# Patient Record
Sex: MALE | Race: ASIAN | Employment: UNEMPLOYED | URBAN - METROPOLITAN AREA
[De-identification: names, ages, dates, MRNs, and addresses within clinical notes are randomized per-mention and may not be internally consistent; named-entity substitution may affect disease eponyms.]

---

## 2018-10-06 ENCOUNTER — HOSPITAL ENCOUNTER (EMERGENCY)
Age: 58
Discharge: HOME | End: 2018-10-06
Attending: EMERGENCY MEDICINE
Payer: SELF-PAY

## 2018-10-06 ENCOUNTER — APPOINTMENT (OUTPATIENT)
Dept: CT IMAGING | Age: 58
End: 2018-10-06
Attending: NURSE PRACTITIONER
Payer: SELF-PAY

## 2018-10-06 VITALS
TEMPERATURE: 98 F | WEIGHT: 192 LBS | SYSTOLIC BLOOD PRESSURE: 188 MMHG | BODY MASS INDEX: 32.78 KG/M2 | HEIGHT: 64 IN | HEART RATE: 79 BPM | OXYGEN SATURATION: 98 % | DIASTOLIC BLOOD PRESSURE: 94 MMHG | RESPIRATION RATE: 16 BRPM

## 2018-10-06 DIAGNOSIS — M53.9 MULTILEVEL DEGENERATIVE DISC DISEASE: ICD-10-CM

## 2018-10-06 DIAGNOSIS — M54.50 LUMBAR BACK PAIN: Primary | ICD-10-CM

## 2018-10-06 LAB
ANION GAP BLD CALC-SCNC: 17 MMOL/L (ref 10–20)
BUN BLD-MCNC: 11 MG/DL (ref 9–20)
CA-I BLD-MCNC: 1.05 MMOL/L (ref 1.12–1.32)
CHLORIDE BLD-SCNC: 100 MMOL/L (ref 98–107)
CO2 BLD-SCNC: 27 MMOL/L (ref 21–32)
CREAT BLD-MCNC: 1 MG/DL (ref 0.6–1.3)
GLUCOSE BLD-MCNC: 85 MG/DL (ref 65–100)
HCT VFR BLD CALC: 55 % (ref 36.6–50.3)
POTASSIUM BLD-SCNC: 3.9 MMOL/L (ref 3.5–5.1)
SERVICE CMNT-IMP: ABNORMAL
SODIUM BLD-SCNC: 140 MMOL/L (ref 136–145)

## 2018-10-06 PROCEDURE — 99283 EMERGENCY DEPT VISIT LOW MDM: CPT

## 2018-10-06 PROCEDURE — 80047 BASIC METABLC PNL IONIZED CA: CPT

## 2018-10-06 PROCEDURE — 72131 CT LUMBAR SPINE W/O DYE: CPT

## 2018-10-06 RX ORDER — AMLODIPINE BESYLATE 5 MG/1
5 TABLET ORAL DAILY
COMMUNITY
End: 2019-01-04 | Stop reason: SDUPTHER

## 2018-10-06 RX ORDER — ATENOLOL 50 MG/1
50 TABLET ORAL DAILY
COMMUNITY
End: 2018-11-02 | Stop reason: ALTCHOICE

## 2018-10-06 NOTE — ED PROVIDER NOTES
HPI Comments: Eliseo Boyce is a 62 y.o. male who presents ambulatory to the ED with  c/o low back pain. Patient states he has a \"few year\" history of low back pain. Patient states he has been to multiple doctors that \"just do not give me any answers. \" Patient denies recent trauma, stating the pain has increased over the last few months. Patient also states he has had a couple on incidents of being incontinent of bowel and bladder. Has seen a chiropractor with no relief. 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 Also states he is non compliant with ordered BP medications. Denies chest pain, SOB, dizziness, recent trauma, denies fever or chills, nausea or vomiting. Denies any current neurologic deficit. Has never had any imaging. PCP: No primary care provider on file. PMHx significant for: No past medical history on file. PSHx significant for: No past surgical history on file. There are no further complaints or symptoms at this time. The history is provided by the patient. 2:07 PM 
I have evaluated the patient as the Provider in Triage. I have reviewed His vital signs and the triage nurse assessment. I have talked with the patient and any available family and advised that I am the provider in triage and have ordered the appropriate study to initiate their work up based on the clinical presentation during my assessment. I have advised that the patient will be accommodated in the Main ED as soon as possible. I have also requested to contact the triage nurse or myself immediately if the patient experiences any changes in their condition during this brief waiting period. 63 yo M presents with right lower back pain onset 30 min ago. /111 in triage, states he did not take his BP meds today. Miriam Cherry MD 
 
 
No past medical history on file. No past surgical history on file. No family history on file. Social History Social History  Marital status: N/A  
 Spouse name: N/A  
 Number of children: N/A  
 Years of education: N/A Occupational History  Not on file. Social History Main Topics  Smoking status: Not on file  Smokeless tobacco: Not on file  Alcohol use Not on file  Drug use: Not on file  Sexual activity: Not on file Other Topics Concern  Not on file Social History Narrative ALLERGIES: Review of patient's allergies indicates no known allergies. Review of Systems Constitutional: Positive for activity change (\"moves at a slower pace\"). Negative for appetite change, chills, fatigue and fever. HENT: Negative for congestion, ear discharge, ear pain, sinus pain, sinus pressure, sore throat and trouble swallowing. Eyes: Negative for photophobia, pain, redness, itching and visual disturbance. Respiratory: Negative for chest tightness and shortness of breath. Cardiovascular: Negative for chest pain and palpitations. Gastrointestinal: Negative for abdominal distention, abdominal pain, nausea and vomiting. Endocrine: Negative. Genitourinary: Negative for difficulty urinating, frequency and urgency. Musculoskeletal: Positive for back pain (lumbar). Negative for neck pain and neck stiffness. Skin: Negative for color change, pallor, rash and wound. Allergic/Immunologic: Negative. Neurological: Negative for dizziness, syncope, weakness and headaches. Hematological: Does not bruise/bleed easily. Psychiatric/Behavioral: Negative for behavioral problems. The patient is not nervous/anxious. There were no vitals filed for this visit. Physical Exam  
Constitutional: He is oriented to person, place, and time. He appears well-developed and well-nourished. No distress. HENT:  
Head: Normocephalic and atraumatic.   
Right Ear: External ear normal.  
Left Ear: External ear normal.  
Nose: Nose normal.  
Mouth/Throat: Oropharynx is clear and moist.  
 Eyes: Conjunctivae and EOM are normal. Pupils are equal, round, and reactive to light. Right eye exhibits no discharge. Left eye exhibits no discharge. Neck: Normal range of motion. Neck supple. No JVD present. No tracheal deviation present. Cardiovascular: Normal rate, regular rhythm, normal heart sounds and intact distal pulses. Exam reveals no gallop. No murmur heard. Noted blood pressure, follow up with PCP Pulmonary/Chest: Effort normal and breath sounds normal. No respiratory distress. He has no wheezes. He has no rales. He exhibits no tenderness. Abdominal: Soft. Bowel sounds are normal. He exhibits no distension. There is no tenderness. There is no rebound and no guarding. Genitourinary:  
Genitourinary Comments: Negative, no current urinary or bowel incontinence Musculoskeletal: Normal range of motion. He exhibits tenderness (lumbar spine, no step offs, no deformities. ). He exhibits no edema. Neurological: He is alert and oriented to person, place, and time. Skin: Skin is warm and dry. No rash noted. No erythema. No pallor. Psychiatric: He has a normal mood and affect. His behavior is normal. Judgment and thought content normal.  
Nursing note and vitals reviewed. MDM Number of Diagnoses or Management Options Lumbar back pain: established and worsening Multilevel degenerative disc disease: established and worsening Diagnosis management comments: Plan: 
Discharge to home and follow up with PCP, follow up with orthopedics. Return to ed with any loss of sensation for bowel or bladder. Amount and/or Complexity of Data Reviewed Clinical lab tests: ordered and reviewed Tests in the radiology section of CPT®: ordered and reviewed ED Course 4:05 PM 
Pt has been reexamined. Pt has no new complaints, changes or physical findings. Care plan outlined and precautions discussed. All available results were reviewed with pt. All medications were reviewed with pt.  All of pt's questions and concerns were addressed. Pt agrees to F/U as instructed and agrees to return to ED upon further deterioration. Pt is ready to go home. Hazel Walker NP 
' Procedures

## 2018-10-06 NOTE — DISCHARGE INSTRUCTIONS
Back Pain: Care Instructions  Your Care Instructions    Back pain has many possible causes. It is often related to problems with muscles and ligaments of the back. It may also be related to problems with the nerves, discs, or bones of the back. Moving, lifting, standing, sitting, or sleeping in an awkward way can strain the back. Sometimes you don't notice the injury until later. Arthritis is another common cause of back pain. Although it may hurt a lot, back pain usually improves on its own within several weeks. Most people recover in 12 weeks or less. Using good home treatment and being careful not to stress your back can help you feel better sooner. Follow-up care is a key part of your treatment and safety. Be sure to make and go to all appointments, and call your doctor if you are having problems. It's also a good idea to know your test results and keep a list of the medicines you take. How can you care for yourself at home? · Sit or lie in positions that are most comfortable and reduce your pain. Try one of these positions when you lie down:  ¨ Lie on your back with your knees bent and supported by large pillows. ¨ Lie on the floor with your legs on the seat of a sofa or chair. Jazmine Cobble on your side with your knees and hips bent and a pillow between your legs. ¨ Lie on your stomach if it does not make pain worse. · Do not sit up in bed, and avoid soft couches and twisted positions. Bed rest can help relieve pain at first, but it delays healing. Avoid bed rest after the first day of back pain. · Change positions every 30 minutes. If you must sit for long periods of time, take breaks from sitting. Get up and walk around, or lie in a comfortable position. · Try using a heating pad on a low or medium setting for 15 to 20 minutes every 2 or 3 hours. Try a warm shower in place of one session with the heating pad. · You can also try an ice pack for 10 to 15 minutes every 2 to 3 hours.  Put a thin cloth between the ice pack and your skin. · Take pain medicines exactly as directed. ¨ If the doctor gave you a prescription medicine for pain, take it as prescribed. ¨ If you are not taking a prescription pain medicine, ask your doctor if you can take an over-the-counter medicine. · Take short walks several times a day. You can start with 5 to 10 minutes, 3 or 4 times a day, and work up to longer walks. Walk on level surfaces and avoid hills and stairs until your back is better. · Return to work and other activities as soon as you can. Continued rest without activity is usually not good for your back. · To prevent future back pain, do exercises to stretch and strengthen your back and stomach. Learn how to use good posture, safe lifting techniques, and proper body mechanics. When should you call for help? Call your doctor now or seek immediate medical care if:    · You have new or worsening numbness in your legs.     · You have new or worsening weakness in your legs. (This could make it hard to stand up.)     · You lose control of your bladder or bowels.    Watch closely for changes in your health, and be sure to contact your doctor if:    · You have a fever, lose weight, or don't feel well.     · You do not get better as expected. Where can you learn more? Go to http://tracy-tana.info/. Enter D374 in the search box to learn more about \"Back Pain: Care Instructions. \"  Current as of: November 29, 2017  Content Version: 11.8  © 7551-5766 [employer]. Care instructions adapted under license by [employer] (which disclaims liability or warranty for this information). If you have questions about a medical condition or this instruction, always ask your healthcare professional. [place] [address] any warranty or liability for your use of this information. Learning About Relief for Back Pain  What is back tension and strain?     Back strain happens when you overstretch, or pull, a muscle in your back. You may hurt your back in an accident or when you exercise or lift something. Most back pain will get better with rest and time. You can take care of yourself at home to help your back heal.  What can you do first to relieve back pain? When you first feel back pain, try these steps:  · Walk. Take a short walk (10 to 20 minutes) on a level surface (no slopes, hills, or stairs) every 2 to 3 hours. Walk only distances you can manage without pain, especially leg pain. · Relax. Find a comfortable position for rest. Some people are comfortable on the floor or a medium-firm bed with a small pillow under their head and another under their knees. Some people prefer to lie on their side with a pillow between their knees. Don't stay in one position for too long. · Try heat or ice. Try using a heating pad on a low or medium setting, or take a warm shower, for 15 to 20 minutes every 2 to 3 hours. Or you can buy single-use heat wraps that last up to 8 hours. You can also try an ice pack for 10 to 15 minutes every 2 to 3 hours. You can use an ice pack or a bag of frozen vegetables wrapped in a thin towel. There is not strong evidence that either heat or ice will help, but you can try them to see if they help. You may also want to try switching between heat and cold. · Take pain medicine exactly as directed. ¨ If the doctor gave you a prescription medicine for pain, take it as prescribed. ¨ If you are not taking a prescription pain medicine, ask your doctor if you can take an over-the-counter medicine. What else can you do? · Stretch and exercise. Exercises that increase flexibility may relieve your pain and make it easier for your muscles to keep your spine in a good, neutral position. And don't forget to keep walking. · Do self-massage. You can use self-massage to unwind after work or school or to energize yourself in the morning.  You can easily massage your feet, hands, or neck. Self-massage works best if you are in comfortable clothes and are sitting or lying in a comfortable position. Use oil or lotion to massage bare skin. · Reduce stress. Back pain can lead to a vicious [place]: Distress about the pain tenses the muscles in your back, which in turn causes more pain. Learn how to relax your mind and your muscles to lower your stress. Where can you learn more? Go to http://rtacy-tana.info/. Enter S112 in the search box to learn more about \"Learning About Relief for Back Pain. \"  Current as of: November 29, 2017  Content Version: 11.8  © 0707-5081 [employer]. Care instructions adapted under license by [employer] (which disclaims liability or warranty for this information). If you have questions about a medical condition or this instruction, always ask your healthcare professional. [place] [address] any warranty or liability for your use of this information. Low Back Pain: Exercises  Your Care Instructions  Here are some examples of typical rehabilitation exercises for your condition. Start each exercise slowly. Ease off the exercise if you start to have pain. Your doctor or physical therapist will tell you when you can start these exercises and which ones will work best for you. How to do the exercises  Press-up    1. Lie on your stomach, supporting your body with your forearms. 2. Press your elbows down into the floor to raise your upper back. As you do this, relax your stomach muscles and allow your back to arch without using your back muscles. As your press up, do not let your hips or pelvis come off the floor. 3. Hold for 15 to 30 seconds, then relax. 4. Repeat 2 to 4 times. Alternate arm and leg (bird dog) exercise    1. Start on the floor, on your hands and knees. 2. Tighten your belly muscles. 3. Raise one leg off the floor, and hold it straight out behind you.  Be careful not to let your hip drop down, because that will twist your trunk. 4. Hold for about 6 seconds, then lower your leg and switch to the other leg. 5. Repeat 8 to 12 times on each leg. 6. Over time, work up to holding for 10 to 30 seconds each time. 7. If you feel stable and secure with your leg raised, try raising the opposite arm straight out in front of you at the same time. Knee-to-chest exercise    1. Lie on your back with your knees bent and your feet flat on the floor. 2. Bring one knee to your chest, keeping the other foot flat on the floor (or keeping the other leg straight, whichever feels better on your lower back). 3. Keep your lower back pressed to the floor. Hold for at least 15 to 30 seconds. 4. Relax, and lower the knee to the starting position. 5. Repeat with the other leg. Repeat 2 to 4 times with each leg. 6. To get more stretch, put your other leg flat on the floor while pulling your knee to your chest.  Curl-ups    1. Lie on the floor on your back with your knees bent at a 90-degree angle. Your feet should be flat on the floor, about 12 inches from your buttocks. 2. Cross your arms over your chest. If this bothers your neck, try putting your hands behind your neck (not your head), with your elbows spread apart. 3. Slowly tighten your belly muscles and raise your shoulder blades off the floor. 4. Keep your head in line with your body, and do not press your chin to your chest.  5. Hold this position for 1 or 2 seconds, then slowly lower yourself back down to the floor. 6. Repeat 8 to 12 times. Pelvic tilt exercise    1. Lie on your back with your knees bent. 2. \"Brace\" your stomach. This means to tighten your muscles by pulling in and imagining your belly button moving toward your spine. You should feel like your back is pressing to the floor and your hips and pelvis are rocking back. 3. Hold for about 6 seconds while you breathe smoothly. 4. Repeat 8 to 12 times. Heel dig bridging    1.  Michele Angel on your back with both knees bent and your ankles bent so that only your heels are digging into the floor. Your knees should be bent about 90 degrees. 2. Then push your heels into the floor, squeeze your buttocks, and lift your hips off the floor until your shoulders, hips, and knees are all in a straight line. 3. Hold for about 6 seconds as you continue to breathe normally, and then slowly lower your hips back down to the floor and rest for up to 10 seconds. 4. Do 8 to 12 repetitions. Hamstring stretch in doorway    1. Lie on your back in a doorway, with one leg through the open door. 2. Slide your leg up the wall to straighten your knee. You should feel a gentle stretch down the back of your leg. 3. Hold the stretch for at least 15 to 30 seconds. Do not arch your back, point your toes, or bend either knee. Keep one heel touching the floor and the other heel touching the wall. 4. Repeat with your other leg. 5. Do 2 to 4 times for each leg. Hip flexor stretch    1. Kneel on the floor with one knee bent and one leg behind you. Place your forward knee over your foot. Keep your other knee touching the floor. 2. Slowly push your hips forward until you feel a stretch in the upper thigh of your rear leg. 3. Hold the stretch for at least 15 to 30 seconds. Repeat with your other leg. 4. Do 2 to 4 times on each side. Wall sit    1. Stand with your back 10 to 12 inches away from a wall. 2. Lean into the wall until your back is flat against it. 3. Slowly slide down until your knees are slightly bent, pressing your lower back into the wall. 4. Hold for about 6 seconds, then slide back up the wall. 5. Repeat 8 to 12 times. Follow-up care is a key part of your treatment and safety. Be sure to make and go to all appointments, and call your doctor if you are having problems. It's also a good idea to know your test results and keep a list of the medicines you take. Where can you learn more?   Go to http://tracy-tana.info/. Enter W092 in the search box to learn more about \"Low Back Pain: Exercises. \"  Current as of: November 29, 2017  Content Version: 11.8  © 8205-1419 [employer]. Care instructions adapted under license by [employer] (which disclaims liability or warranty for this information). If you have questions about a medical condition or this instruction, always ask your healthcare professional. [place] [address] any warranty or liability for your use of this information.

## 2018-11-02 ENCOUNTER — OFFICE VISIT (OUTPATIENT)
Dept: FAMILY MEDICINE CLINIC | Age: 58
End: 2018-11-02

## 2018-11-02 VITALS
HEIGHT: 64 IN | TEMPERATURE: 97.7 F | RESPIRATION RATE: 16 BRPM | OXYGEN SATURATION: 97 % | BODY MASS INDEX: 33.46 KG/M2 | DIASTOLIC BLOOD PRESSURE: 117 MMHG | WEIGHT: 196 LBS | SYSTOLIC BLOOD PRESSURE: 166 MMHG | HEART RATE: 89 BPM

## 2018-11-02 DIAGNOSIS — I10 ESSENTIAL HYPERTENSION: ICD-10-CM

## 2018-11-02 DIAGNOSIS — M54.41 CHRONIC BILATERAL LOW BACK PAIN WITH BILATERAL SCIATICA: Primary | ICD-10-CM

## 2018-11-02 DIAGNOSIS — G89.29 CHRONIC BILATERAL LOW BACK PAIN WITH BILATERAL SCIATICA: Primary | ICD-10-CM

## 2018-11-02 DIAGNOSIS — M54.42 CHRONIC BILATERAL LOW BACK PAIN WITH BILATERAL SCIATICA: Primary | ICD-10-CM

## 2018-11-02 RX ORDER — FLUVASTATIN 20 MG/1
20 CAPSULE ORAL
COMMUNITY
End: 2019-01-04 | Stop reason: SDUPTHER

## 2018-11-02 RX ORDER — NAPROXEN 500 MG/1
500 TABLET ORAL 2 TIMES DAILY WITH MEALS
Qty: 30 TAB | Refills: 0 | Status: SHIPPED | OUTPATIENT
Start: 2018-11-02 | End: 2018-11-02 | Stop reason: SDUPTHER

## 2018-11-02 RX ORDER — LISINOPRIL 10 MG/1
10 TABLET ORAL DAILY
Qty: 30 TAB | Refills: 1 | Status: SHIPPED | OUTPATIENT
Start: 2018-11-02 | End: 2018-11-02 | Stop reason: SDUPTHER

## 2018-11-02 RX ORDER — LISINOPRIL 10 MG/1
10 TABLET ORAL DAILY
Qty: 30 TAB | Refills: 1 | Status: SHIPPED | OUTPATIENT
Start: 2018-11-02 | End: 2019-01-04 | Stop reason: SDUPTHER

## 2018-11-02 RX ORDER — NAPROXEN 500 MG/1
500 TABLET ORAL 2 TIMES DAILY WITH MEALS
Qty: 30 TAB | Refills: 0 | Status: SHIPPED | OUTPATIENT
Start: 2018-11-02 | End: 2019-01-04 | Stop reason: SDUPTHER

## 2018-11-02 NOTE — PATIENT INSTRUCTIONS
Learning About Relief for Back Pain  What is back tension and strain? Back strain happens when you overstretch, or pull, a muscle in your back. You may hurt your back in an accident or when you exercise or lift something. Most back pain will get better with rest and time. You can take care of yourself at home to help your back heal.  What can you do first to relieve back pain? When you first feel back pain, try these steps:  · Walk. Take a short walk (10 to 20 minutes) on a level surface (no slopes, hills, or stairs) every 2 to 3 hours. Walk only distances you can manage without pain, especially leg pain. · Relax. Find a comfortable position for rest. Some people are comfortable on the floor or a medium-firm bed with a small pillow under their head and another under their knees. Some people prefer to lie on their side with a pillow between their knees. Don't stay in one position for too long. · Try heat or ice. Try using a heating pad on a low or medium setting, or take a warm shower, for 15 to 20 minutes every 2 to 3 hours. Or you can buy single-use heat wraps that last up to 8 hours. You can also try an ice pack for 10 to 15 minutes every 2 to 3 hours. You can use an ice pack or a bag of frozen vegetables wrapped in a thin towel. There is not strong evidence that either heat or ice will help, but you can try them to see if they help. You may also want to try switching between heat and cold. · Take pain medicine exactly as directed. ? If the doctor gave you a prescription medicine for pain, take it as prescribed. ? If you are not taking a prescription pain medicine, ask your doctor if you can take an over-the-counter medicine. What else can you do? · Stretch and exercise. Exercises that increase flexibility may relieve your pain and make it easier for your muscles to keep your spine in a good, neutral position. And don't forget to keep walking. · Do self-massage.  You can use self-massage to unwind after work or school or to energize yourself in the morning. You can easily massage your feet, hands, or neck. Self-massage works best if you are in comfortable clothes and are sitting or lying in a comfortable position. Use oil or lotion to massage bare skin. · Reduce stress. Back pain can lead to a vicious [place]: Distress about the pain tenses the muscles in your back, which in turn causes more pain. Learn how to relax your mind and your muscles to lower your stress. Where can you learn more? Go to http://tracy-tana.info/. Enter B663 in the search box to learn more about \"Learning About Relief for Back Pain. \"  Current as of: November 29, 2017  Content Version: 11.8  © 1050-6948 [employer]. Care instructions adapted under license by [employer] (which disclaims liability or warranty for this information). If you have questions about a medical condition or this instruction, always ask your healthcare professional. [place] [address] any warranty or liability for your use of this information.

## 2018-11-02 NOTE — PROGRESS NOTES
[address] [place] [address]   Office [phone], Fax [phone]    Subjective:     Chief Complaint   Patient presents with    Establish Care    Back Pain     chronic low back pain     History provided by patient and significant other    HPI:  Twila Perry is a 62 y.o.  male presents for establishment of care and low back pain. Significant history pertinent to presentation includes stroke (10yrs ago), HTN, CAD. Acute on chronic back pain  - Had it over 10 years, started when he fell after his stroke. +bilateral shooting pain. Seen chiropractor helped a bit. OTC Advil. Denies fever/chills, wt loss, change in sensation. In process of getting care card. Ancelmo Jack to ED on 10/6 with CT spine lubmar with no acute fracture but multilevel degenerative disease @ T12- L4, facet arthropathy, spinal stenosis. HTN  - On atenolol and norvasc. Unable to afford atenolol. Does not check BP at home. Medication reviewed. Allergy reviewed. PMHx reviewed. CAD, stroke, HTN  No surgHx    SocHx  - +smokes.  Denies alcohol, and illicit drugs    ROS (bolded are positive):     General Negative for fever, chills, changes in weight, changes in appetite   HEENT Negative for hearing loss, earache, congestion, sore throat   CV Negative for chest pain, palpitations, edema   Respiratory Negative for cough, shortness of breath, wheezing   GI Negative for change in bowel habits, abdominal pain, black or bloody stools, nausea or vomiting    Negative for frequency, dysuria, hematuria, penile discharge   MSK Negative for back pain, joint pain, muscle pain   Skin Negative for itching, rash, hives   Neuro Negative for dizziness, headache, confusion, weakness   Psych Negative for anxiety, depression, change in mood     Objective:   Vitals - reviewed  Visit Vitals  BP (!) 166/117   Pulse 89   Temp 97.7 °F (36.5 °C) (Oral)   Resp 16   Ht 5' 4\" (1.626 m)   Wt 196 lb (88.9 kg)   SpO2 97%   BMI 33.64 kg/m² Physical exam:   GEN: NAD. Alert. Obese. LUNGS: Respirations unlabored; CTAB. no wheeze, rales, rhonchi   CARDIOVASCULAR: Regular, rate, and rhythm without murmurs, gallops or rubs   ABDOMEN: Soft; NT, ND. +bowel sounds; no rebound tenderness, no guarding. no masses or organomegaly  NEUROLOGIC: No focal neurologic deficits. Strength and sensation grossly intact. EXT: Well perfused. No edema. No erythema. SKIN: No obvious rashes or lesions. MSK:    Posture: Normal   Deformity: None    ROM:     Flexion: Normal    Extension: Normal     Lateral bending: Normal      Palpation:    L1-L5: No tenderness    Sacrum: No tenderness    Coccyx: No tenderness    Left Paraspinal: + tenderness    Right Paraspinal: + tenderness     Strength (0-5/5)    Hip Flexion:   Left: 5/5  Right: 5/5    Hip Extension:  Left: 5/5  Right: 5/5    Hip Abduction:  Left: 5/5  Right: 5/5    Hip Adduction:  Left: 5/5  Right: 5/5    Knee Extension:  Left: 5/5  Right: 5/5    Knee Flexion:   Left: 5/5  Right: 5/5     Sensation: Intact, no deficits      Special test:    Straight leg: Left: Negative  Right: Negative      Pertinent Labs/Studies: n/a    Assessment and orders:       ICD-10-CM ICD-9-CM    1. Chronic bilateral low back pain with bilateral sciatica M54.42 724.2 REFERRAL TO PHYSICAL THERAPY    M54.41 724.3 naproxen (NAPROSYN) 500 mg tablet    G89.29 338.29    2. Essential hypertension I10 401.9 lisinopril (PRINIVIL, ZESTRIL) 10 mg tablet      DISCONTINUED: lisinopril (PRINIVIL, ZESTRIL) 10 mg tablet     Diagnoses and all orders for this visit:    1. Chronic bilateral low back pain with bilateral sciatica. Acute on chronic. No red flag signs. Recent CT spine with DDD changes and spinal stenosis. Will send for PT and give naproxen for inflammation.   -     REFERRAL TO PHYSICAL THERAPY  -     naproxen (NAPROSYN) 500 mg tablet; Take 1 Tab by mouth two (2) times daily (with meals). 2. Essential hypertension.  Not at goal. Prescribed atenolol and Norvasc but cant afford atenolol and has not been taking it. Sent Rx to [facility] to get lisinopril and Norvasc for free. -     lisinopril (PRINIVIL, ZESTRIL) 10 mg tablet; Take 1 Tab by mouth daily. Follow-up Disposition:  Return in about 5 weeks (around 12/7/2018). Pt was discussed with Dr Ezra Prieto (attending physician). I have reviewed patient medical and social history and medications. I have reviewed pertinent labs results and other data. I have discussed the diagnosis with the patient and the intended plan as seen in the above orders. The patient has received an after-visit summary and questions were answered concerning future plans. I have discussed medication side effects and warnings with the patient as well.     Taiwo Ye MD  Resident [facility]  11/03/18

## 2018-11-02 NOTE — PROGRESS NOTES
Chief Complaint   Patient presents with   [facility]    Back Pain     chronic low back pain

## 2018-11-12 ENCOUNTER — HOSPITAL ENCOUNTER (OUTPATIENT)
Dept: PHYSICAL THERAPY | Age: 58
Discharge: HOME | End: 2018-11-12
Payer: SUBSIDIZED

## 2018-11-12 PROCEDURE — 97162 PT EVAL MOD COMPLEX 30 MIN: CPT | Performed by: PHYSICAL THERAPIST

## 2018-11-12 PROCEDURE — 97110 THERAPEUTIC EXERCISES: CPT | Performed by: PHYSICAL THERAPIST

## 2018-11-12 PROCEDURE — 97014 ELECTRIC STIMULATION THERAPY: CPT | Performed by: PHYSICAL THERAPIST

## 2018-11-12 NOTE — PROGRESS NOTES
PT INITIAL EVALUATION NOTE 2-15 Patient Name: Georges Laguerre Date:2018 : 1960 [x]  Patient  Verified Payor: SELF PAY / Plan: [employer] SELF PAY / Product Type: Self Pay / In time:1:00 pm  Out time:2:00 pm 
Total Treatment Time (min): 60 Visit #: 1 Treatment Area: Low back pain [M54.5] SUBJECTIVE Pain Level (0-10 scale): 8/10 at worst; 3/10 currently Any medication changes, allergies to medications, adverse drug reactions, diagnosis change, or new procedure performed?: [] No    [x] Yes (see summary sheet for update) Subjective:    
Pt reports severe back pain that began several months ago when he was seeing his sister in law. He reports difficulty with walking and balance and has been falling often because 'my back gives out'. He states that he does not have carotid blood flow on the L side since his last stroke. He states that he has had a doppler and 'it is dead'. Pt fell yesterday and almost fell another 2 times. He will do this many days per week. He feels like his back will be 'ready to give out'. He has cramps in his legs as well. He c/o bilateral foot burning, bilateral calf and hamstring/quad pain as well as radiating symptoms into his feet. Pt states that he has difficulty with short term memory after his strokes. 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  
FABQ Score: see FOTO scanned into chart Cognition: A & O x 4 OBJECTIVE/EXAMINATION Posture:  Ant pelvic tilt with abdominal fat present Other Observations:  Pt is short in stature Gait and Functional Mobility:  Antalgic gait with unsteady foot stepping Palpation: hypertonicity present thoracic and lumbar paraspinals as well as QL bilaterally Lumbar AROM:     
    R  L Flexion    Limited by pn and stiffness; abdominal pannus limits fwd bending of trunk Extension   Limited through thoracic spine with limited kyphosis curvature reversing present Side Bending   50% bilaterally with tightness present Rotation   Bilaterally limited and pnfl LOWER QUARTER   MUSCLE STRENGTH 
KEY       R  L 
0 - No Contraction  L1, L2 Psoas  4  4 
1 - Trace   L3 Quads  4  4 
2 - Poor   L4 Tib Ant  4  4 
3 - Fair    L5 EHL  4  4 
4 - Good   S1 FHL  4  4 
5 - Normal   S2 Hams  4  4 Neurological: Reflexes / Sensations: WNL LE dermatomes Special Tests: Single LE stance R 6 sec, L 11 sec 
 TUG: abnormal for fall risk secondary to time and appearance of LOB during gait and during turn Trendelenberg: + Bilaterally Forward Bend: + for back and ant hip pain Slump: + bilaterally at full knee ext Modality rationale: decrease inflammation, decrease pain and increase tissue extensibility to improve the patients ability to sit and stand with less pain Type Additional Details [x] Estim: []Att   [x]Unatt   X 15 minutes     []TENS instruct [x]IFC  []Premod   []NMES []Other:  []w/US   []w/ice   [x]w/heat Position: supine Location: lumbar spine  
[]  Traction: [] Cervical       []Lumbar 
                     [] Prone          []Supine []Intermittent   []Continuous Lbs: 
[] before manual 
[] after manual 
[]w/heat  
[]  Ultrasound: []Continuous   [] Pulsed at:  
                         []1MHz   []3MHz Location: 
W/cm2:  
[]  Paraffin Location: 
[]w/heat  
[]  Ice     []  Heat 
[]  Ice massage Position: Location:  
[]  Laser 
[]  Other: Position: Location:  
[]  Vasopneumatic Device Pressure:       [] lo [] med [] hi  
Temperature:   
[x] Skin assessment post-treatment:  [x]intact [x]redness- no adverse reaction 
  []redness  adverse reaction:  
 
15 min Therapeutic Exercise:  [x] See flow sheet :  
Rationale: increase ROM, increase strength, improve coordination, improve balance and increase proprioception to improve the patients ability to return to N ADL skills and return to work With 
 [x] TE 
 [] TA 
 [] neuro 
 [] other: Patient Education: [x] Review HEP [x] Progressed/Changed HEP based on:  
[] positioning   [] body mechanics   [] transfers   [] heat/ice application   
[] other:   
 
 
Other Objective/Functional Measures: see FOTO scanned into chart Pain Level (0-10 scale) post treatment: 2/10 ASSESSMENT:  
  
[x]  See Plan of Care Meliza Michael PT, DPT, [facility] 11/12/2018  1:09 PM

## 2018-11-12 NOTE — PROGRESS NOTES
[facility] [address], [address] Sarath Harris Phone: [phone]  Fax: [phone] Plan of Care/ Statement of Necessity for Physical Therapy Services 2-15 Patient name: Iris Osman  : 1960  Provider#: [id number] Referral source: Eliza Roberts MD     
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 
 
Start of Care: 2018      Onset Date: several months ago The Plan of Care and following information is based on the information from the initial evaluation. Assessment/ key information: Pt demonstrates fall risk, poor positional tolerance, gait deviation, inability to work and poor ADL and IADL tolerance. He will benefit from skilled PT prior to D/C to allow return to work, improved positional tolerance and improved ADL/IADL skills. Pt presents with significant commodities at this time. Evaluation Complexity History HIGH Complexity :3+ comorbidities / personal factors will impact the outcome/ POC ; Examination HIGH Complexity : 4+ Standardized tests and measures addressing body structure, function, activity limitation and / or participation in recreation  ;Presentation MEDIUM Complexity : Evolving with changing characteristics  ; Clinical Decision Making MEDIUM Complexity : FOTO score of 26-74 Overall Complexity Rating: MEDIUM Problem List: pain affecting function, decrease ROM, decrease strength, impaired gait/ balance, decrease ADL/ functional abilitiies, decrease activity tolerance, decrease flexibility/ joint mobility and decrease transfer abilities Treatment Plan may include any combination of the following: Therapeutic exercise, Therapeutic activities, Neuromuscular re-education, Physical agent/modality, Gait/balance training, Manual therapy, Patient education, Self Care training, Functional mobility training, Home safety training and Stair training Patient / Family readiness to learn indicated by: asking questions, trying to perform skills and interest 
Persons(s) to be included in education: patient (P) Barriers to Learning/Limitations: yes;  cognitive and other pt lives in [place] and has some short term memory loss Patient Goal (s): to get back to work and have less pain Patient Self Reported Health Status: good Rehabilitation Potential: fair Short Term Goals: To be accomplished in 3 weeks: Pt will demo all transfers and bed mobility without limitation present Pt will demo all HEP with no v.c. Needed Pt will demo SLS improvement to a minimum of 20 seconds bilterally Long Term Goals: To be accomplished in 6-8 weeks: Pt will demo low fall risk on appropriate balance scale or standardized testing Pt will demo lifting of box x 25 lb from knee height to waste height Pt will demo proper mechanics of lifting to allow for return to work Pt will demo bilateral LE strength WNL and equal bilaterally Frequency / Duration: Patient to be seen 2 times per week for up to 6-8 weeks. Patient/ Caregiver education and instruction: self care, activity modification and exercises 
 
[x]  Plan of care has been reviewed with ZOFIA Peña, PT , DPT, [facility] 11/12/2018 2:09 PM 
 
________________________________________________________________________ I certify that the above Therapy Services are being furnished while the patient is under my care. I agree with the treatment plan and certify that this therapy is necessary. [de-identified] Signature:____________________  Date:____________Time: _________

## 2018-11-14 ENCOUNTER — HOSPITAL ENCOUNTER (OUTPATIENT)
Dept: PHYSICAL THERAPY | Age: 58
Discharge: HOME | End: 2018-11-14
Payer: SUBSIDIZED

## 2018-11-14 PROCEDURE — 97110 THERAPEUTIC EXERCISES: CPT | Performed by: PHYSICAL MEDICINE & REHABILITATION

## 2018-11-14 PROCEDURE — 97014 ELECTRIC STIMULATION THERAPY: CPT | Performed by: PHYSICAL MEDICINE & REHABILITATION

## 2018-11-14 NOTE — PROGRESS NOTES
PT DAILY TREATMENT NOTE - [facility] 2-15 Patient Name: Mae Rapp Date:2018 : 1960 [x]  Patient  Verified Payor: SELF PAY / Plan: [employer]SI SELF PAY / Product Type: Self Pay / In time:230 pm  Out time:340 pm 
Total Treatment Time (min): 70 Total Timed Codes (min): 55 
1:1 Treatment Time ([facility] only): - Visit #: 2 Treatment Area: Low back pain [M54.5] SUBJECTIVE Pain Level (0-10 scale): 6/10 Any medication changes, allergies to medications, adverse drug reactions, diagnosis change, or new procedure performed?: [x] No    [] Yes (see summary sheet for update) Subjective functional status/changes:   [] No changes reported Patient reported that when he was getting out his car today his pain went up to a 8/10. Patient stated he continues to have radiating pain down both legs. OBJECTIVE Modality rationale: decrease inflammation, decrease pain and increase tissue extensibility to improve the patients ability to ADLs, standing and walking tolerance Type Additional Details [x] Estim: []Att   [x]Unatt        []TENS instruct [x]IFC  []Premod   []NMES []Other:  []w/US   []w/ice   [x]w/heat Position: supine Location: low back  
[]  Traction: [] Cervical       []Lumbar 
                     [] Prone          []Supine []Intermittent   []Continuous Lbs: 
[] before manual 
[] after manual 
[]w/heat  
[]  Ultrasound: []Continuous   [] Pulsed at: 
                         []1MHz   []3MHz Location: 
W/cm2:  
[] Paraffin Location:  
[]w/heat  
[]  Ice     []  Heat 
[]  Ice massage Position: Location:  
[]  Laser 
[]  Other: Position: Location:  
[]  Vasopneumatic Device Pressure:       [] lo [] med [] hi  
Temperature:   
 
[x] Skin assessment post-treatment:  [x]intact []redness- no adverse reaction 
  []redness  adverse reaction:  
 
55 min Therapeutic Exercise:  [x] See flow sheet :  
 Rationale: increase ROM, increase strength, improve coordination, improve balance and increase proprioception to improve the patients ability to ADLs, standing and walking tolerance With 
 [x] TE 
 [] TA 
 [] neuro 
 [] other: Patient Education: [x] Review HEP [] Progressed/Changed HEP based on:  
[] positioning   [] body mechanics   [] transfers   [] heat/ice application   
[] other:   
 
Other Objective/Functional Measures:  
Patient tolerated today's exercises very well with a decrease in pain on he bike. Poor ability to perform PPT today Pain Level (0-10 scale) post treatment: \"better\" ASSESSMENT/Changes in Function:  
 
Patient will continue to benefit from skilled PT services to modify and progress therapeutic interventions, address functional mobility deficits, address ROM deficits, address strength deficits, analyze and address soft tissue restrictions, analyze and cue movement patterns, analyze and modify body mechanics/ergonomics and assess and modify postural abnormalities to attain remaining goals. []  See Plan of Care 
[]  See progress note/recertification 
[]  See Discharge Summary Progress towards goals / Updated goals: 
Patient is progressing slowly towards goals due to high levels of pain present. PLAN [x]  Upgrade activities as tolerated     [x]  Continue plan of care [x]  Update interventions per flow sheet      
[]  Discharge due to:_ 
[]  Other:_ Jay Hendricks PTA, CPT 11/14/2018  2:47 PM

## 2018-11-20 ENCOUNTER — APPOINTMENT (OUTPATIENT)
Dept: PHYSICAL THERAPY | Age: 58
End: 2018-11-20
Payer: SUBSIDIZED

## 2018-11-26 ENCOUNTER — APPOINTMENT (OUTPATIENT)
Dept: PHYSICAL THERAPY | Age: 58
End: 2018-11-26
Payer: SUBSIDIZED

## 2018-11-28 ENCOUNTER — APPOINTMENT (OUTPATIENT)
Dept: PHYSICAL THERAPY | Age: 58
End: 2018-11-28
Payer: SUBSIDIZED

## 2018-12-05 ENCOUNTER — APPOINTMENT (OUTPATIENT)
Dept: PHYSICAL THERAPY | Age: 58
End: 2018-12-05

## 2019-01-04 ENCOUNTER — OFFICE VISIT (OUTPATIENT)
Dept: FAMILY MEDICINE CLINIC | Age: 59
End: 2019-01-04

## 2019-01-04 DIAGNOSIS — M54.42 CHRONIC BILATERAL LOW BACK PAIN WITH BILATERAL SCIATICA: Primary | ICD-10-CM

## 2019-01-04 DIAGNOSIS — G89.29 CHRONIC BILATERAL LOW BACK PAIN WITH BILATERAL SCIATICA: Primary | ICD-10-CM

## 2019-01-04 DIAGNOSIS — M54.41 CHRONIC BILATERAL LOW BACK PAIN WITH BILATERAL SCIATICA: Primary | ICD-10-CM

## 2019-01-04 DIAGNOSIS — I10 ESSENTIAL HYPERTENSION: ICD-10-CM

## 2019-01-04 DIAGNOSIS — E78.2 MIXED HYPERLIPIDEMIA: ICD-10-CM

## 2019-01-04 RX ORDER — AMLODIPINE BESYLATE 5 MG/1
5 TABLET ORAL DAILY
Qty: 30 TAB | Refills: 2 | Status: SHIPPED | OUTPATIENT
Start: 2019-01-04 | End: 2019-03-08 | Stop reason: SDUPTHER

## 2019-01-04 RX ORDER — NAPROXEN 500 MG/1
500 TABLET ORAL 2 TIMES DAILY WITH MEALS
Qty: 30 TAB | Refills: 0 | Status: SHIPPED | OUTPATIENT
Start: 2019-01-04 | End: 2019-01-17 | Stop reason: SDUPTHER

## 2019-01-04 RX ORDER — FLUVASTATIN 20 MG/1
20 CAPSULE ORAL
Qty: 30 CAP | Refills: 2 | Status: SHIPPED | OUTPATIENT
Start: 2019-01-04 | End: 2019-05-06 | Stop reason: SDUPTHER

## 2019-01-04 RX ORDER — LISINOPRIL 10 MG/1
10 TABLET ORAL DAILY
Qty: 30 TAB | Refills: 1 | Status: SHIPPED | OUTPATIENT
Start: 2019-01-04 | End: 2019-04-01 | Stop reason: SDUPTHER

## 2019-01-04 NOTE — PROGRESS NOTES
[address] [facility]   Office [phone], Fax [phone]    Subjective:     Chief Complaint   Patient presents with    Follow-up     seen 11/02/2018 for back pain, states taking naproxen prescribed at last visit    Medication Refill     requesting to refill      History provided by patient     HPI:  Cydney Mckeon is a 62 y.o. ASIAN male presents for Med refill for naproxen (chronic back pain), HTN, HLD. Significant history pertinent to presentation includes multilevel degenerative disease @ T12- L4, facet arthropathy, spinal stenosis, HTN, HLD. Chronic b/l low back pain w/ bilateral sciatica  - had seen pt in Nov 2018 and sent to PT. Pt had few sessions and said it has helped but would like refill for Naproxen. Also wondering if there is anything stronger to help him with the pain. Mostly worse while driving (). HTN  - Pt does not check BP at home. - Diet: could be improved. Attempts at low salt intake  - Exercise: limited by back and work   - Alcohol use: none . +smoker   - Denies HA, vision changes. - Currently on lisinopril 10mg and Norvasc 5mg. HLD. Taking fluvastatin 20mg     Medication reviewed. Allergy reviewed. ROS (bolded are positive):   General Negative for fever, chills, changes in weight, changes in appetite   CV Negative for chest pain, palpitations, edema   Respiratory Negative for cough, shortness of breath, wheezing   MSK Negative for back pain, joint pain, muscle pain   Skin Negative for itching, rash, hives   Neuro Negative for dizziness, headache, confusion, weakness   Psych Negative for anxiety, depression, change in mood     Objective:   Vitals - reviewed. Pt just took his BP medications 15-20min before coming to clinic    Visit Vitals  BP (!) 153/93   Pulse 88   Temp 97.8 °F (36.6 °C) (Oral)   Resp 20   Ht 5' 4\" (1.626 m)   Wt 199 lb (90.3 kg)   SpO2 97%   BMI 34.16 kg/m²        Physical exam:   GEN: NAD. Alert.  Well nourished. NECK:  Supple; no masses; thyroid normal           LUNGS: Respirations unlabored; CTAB. no wheeze, rales, rhonchi   CARDIOVASCULAR: Regular, rate, and rhythm without murmurs, gallops or rubs   ABDOMEN: Soft; NT, ND. +bowel sounds; no rebound tenderness, no guarding. no masses or organomegaly  NEUROLOGIC: No focal neurologic deficits. Strength and sensation grossly intact. MSK: Negative straight leg test. B/l paraspinal tenderness. FROM. Good tone. No vertebral tenderness. EXT: Well perfused. No edema. No erythema. PSYCH: appropriate mood and affect. Good insight and judgement. Cooperative. SKIN: No obvious rashes or lesions. Pertinent Labs/Studies:   - No past records    Assessment and orders:       ICD-10-CM ICD-9-CM    1. Chronic bilateral low back pain with bilateral sciatica M54.42 724.2 naproxen (NAPROSYN) 500 mg tablet    M54.41 724.3     G89.29 338.29    2. Essential hypertension I10 401.9 amLODIPine (NORVASC) 5 mg tablet      lisinopril (PRINIVIL, ZESTRIL) 10 mg tablet      HEMOGLOBIN A1C WITH EAG      METABOLIC PANEL, COMPREHENSIVE      AMB POC URINE, MICROALBUMIN, SEMIQUANT (3 RESULTS)   3. Mixed hyperlipidemia E78.2 272.2 fluvastatin (LESCOL) 20 mg capsule      LIPID PANEL     Diagnoses and all orders for this visit:    1. Chronic bilateral low back pain with bilateral sciatica. Advised to continue with PT. Refilled Naproxen. Advised to return if not improving with our sports medicine specialist for different options (patch, steroid injection). -     naproxen (NAPROSYN) 500 mg tablet; Take 1 Tab by mouth two (2) times daily (with meals). 2. Essential hypertension. Advised to bring BP log in 2 weeks. F/u labs. Will refill current medications for now. -     amLODIPine (NORVASC) 5 mg tablet; Take 1 Tab by mouth daily. -     lisinopril (PRINIVIL, ZESTRIL) 10 mg tablet;  Take 1 Tab by mouth daily.  -     HEMOGLOBIN A1C WITH EAG  -     METABOLIC PANEL, COMPREHENSIVE  -     AMB POC URINE, MICROALBUMIN, SEMIQUANT (3 RESULTS)    3. Mixed hyperlipidemia. F/u labs. -     fluvastatin (LESCOL) 20 mg capsule; Take 1 Cap by mouth nightly. -     LIPID PANEL    Follow-up Disposition:  Return in about 2 weeks (around 1/18/2019) for HTN and back pain follow up. Sports medicine specalist (Dr Minal Willingham or Dr Faby Hernandez). Pt was discussed with Dr Eric Nesbitt (attending physician). I have reviewed patient medical and social history and medications. I have reviewed pertinent labs results and other data. I have discussed the diagnosis with the patient and the intended plan as seen in the above orders. The patient has received an after-visit summary and questions were answered concerning future plans. I have discussed medication side effects and warnings with the patient as well.     Jessica Meredith MD  Resident [facility]  01/05/19

## 2019-01-04 NOTE — PROGRESS NOTES
Jimym Wheatley is a 62 y.o. male  Chief Complaint   Patient presents with    Follow-up     seen 11/02/2018 for back pain, states taking naproxen prescribed at last visit    Medication Refill     requesting to refill      Visit Vitals  BP (!) 166/100 (BP 1 Location: Left arm, BP Patient Position: Sitting)   Pulse 91   Temp 97.8 °F (36.6 °C) (Oral)   Resp 20   Ht 5' 4\" (1.626 m)   Wt 199 lb (90.3 kg)   SpO2 97%   BMI 34.16 kg/m²     1. Have you been to the ER, urgent care clinic since your last visit? Hospitalized since your last visit? No    2. Have you seen or consulted any other health care providers outside of the [address] [place] [address] since your last visit? Include any pap smears or colon screening.  No  Health Maintenance Due   Topic Date Due    Hepatitis C Screening  1960    DTaP/Tdap/Td series (1 - Tdap) 05/01/1981    Shingrix Vaccine Age 50> (1 of 2) 05/01/2010    FOBT Q 1 YEAR AGE 50-75  05/01/2010    Influenza Age 9 to Adult  08/01/2018

## 2019-01-05 VITALS
WEIGHT: 199 LBS | TEMPERATURE: 97.8 F | DIASTOLIC BLOOD PRESSURE: 93 MMHG | RESPIRATION RATE: 20 BRPM | HEIGHT: 64 IN | HEART RATE: 88 BPM | BODY MASS INDEX: 33.97 KG/M2 | OXYGEN SATURATION: 97 % | SYSTOLIC BLOOD PRESSURE: 153 MMHG

## 2019-01-05 LAB
ALBUMIN SERPL-MCNC: 4.4 G/DL (ref 3.5–5.5)
ALBUMIN/GLOB SERPL: 1.5 {RATIO} (ref 1.2–2.2)
ALP SERPL-CCNC: 68 IU/L (ref 39–117)
ALT SERPL-CCNC: 13 IU/L (ref 0–44)
AST SERPL-CCNC: 13 IU/L (ref 0–40)
BILIRUB SERPL-MCNC: 0.3 MG/DL (ref 0–1.2)
BUN SERPL-MCNC: 12 MG/DL (ref 6–24)
BUN/CREAT SERPL: 11 (ref 9–20)
CALCIUM SERPL-MCNC: 9.1 MG/DL (ref 8.7–10.2)
CHLORIDE SERPL-SCNC: 101 MMOL/L (ref 96–106)
CHOLEST SERPL-MCNC: 228 MG/DL (ref 100–199)
CO2 SERPL-SCNC: 23 MMOL/L (ref 20–29)
CREAT SERPL-MCNC: 1.07 MG/DL (ref 0.76–1.27)
EST. AVERAGE GLUCOSE BLD GHB EST-MCNC: 126 MG/DL
GLOBULIN SER CALC-MCNC: 2.9 G/DL (ref 1.5–4.5)
GLUCOSE SERPL-MCNC: 67 MG/DL (ref 65–99)
HBA1C MFR BLD: 6 % (ref 4.8–5.6)
HDLC SERPL-MCNC: 35 MG/DL
INTERPRETATION, 910389: NORMAL
LDLC SERPL CALC-MCNC: 141 MG/DL (ref 0–99)
POTASSIUM SERPL-SCNC: 4.1 MMOL/L (ref 3.5–5.2)
PROT SERPL-MCNC: 7.3 G/DL (ref 6–8.5)
SODIUM SERPL-SCNC: 142 MMOL/L (ref 134–144)
TRIGL SERPL-MCNC: 261 MG/DL (ref 0–149)
VLDLC SERPL CALC-MCNC: 52 MG/DL (ref 5–40)

## 2019-01-16 NOTE — PROGRESS NOTES
Prediabetic range 6.0, elevated on a statin already (will compare with next check, no other records to compare with). nml CMP.

## 2019-01-17 ENCOUNTER — OFFICE VISIT (OUTPATIENT)
Dept: FAMILY MEDICINE CLINIC | Age: 59
End: 2019-01-17

## 2019-01-17 VITALS
OXYGEN SATURATION: 99 % | TEMPERATURE: 97.9 F | WEIGHT: 201 LBS | HEART RATE: 96 BPM | BODY MASS INDEX: 34.31 KG/M2 | RESPIRATION RATE: 18 BRPM | HEIGHT: 64 IN | DIASTOLIC BLOOD PRESSURE: 89 MMHG | SYSTOLIC BLOOD PRESSURE: 147 MMHG

## 2019-01-17 DIAGNOSIS — M79.18 MYOFASCIAL MUSCLE PAIN: ICD-10-CM

## 2019-01-17 DIAGNOSIS — M53.3 SACROILIAC JOINT DYSFUNCTION OF BOTH SIDES: ICD-10-CM

## 2019-01-17 DIAGNOSIS — M54.42 CHRONIC BILATERAL LOW BACK PAIN WITH BILATERAL SCIATICA: ICD-10-CM

## 2019-01-17 DIAGNOSIS — G89.29 CHRONIC BILATERAL LOW BACK PAIN WITH BILATERAL SCIATICA: ICD-10-CM

## 2019-01-17 DIAGNOSIS — M70.61 TROCHANTERIC BURSITIS OF RIGHT HIP: ICD-10-CM

## 2019-01-17 DIAGNOSIS — G62.9 NEUROPATHY: Primary | ICD-10-CM

## 2019-01-17 DIAGNOSIS — M54.41 CHRONIC BILATERAL LOW BACK PAIN WITH BILATERAL SCIATICA: ICD-10-CM

## 2019-01-17 RX ORDER — TRIAMCINOLONE ACETONIDE 40 MG/ML
40 INJECTION, SUSPENSION INTRA-ARTICULAR; INTRAMUSCULAR ONCE
Qty: 1 ML | Refills: 0
Start: 2019-01-17 | End: 2019-01-17

## 2019-01-17 RX ORDER — LIDOCAINE HYDROCHLORIDE 10 MG/ML
3 INJECTION INFILTRATION; PERINEURAL ONCE
Qty: 3 ML | Refills: 0
Start: 2019-01-17 | End: 2019-01-17

## 2019-01-17 RX ORDER — LIDOCAINE HYDROCHLORIDE 10 MG/ML
5 INJECTION INFILTRATION; PERINEURAL ONCE
Qty: 3 ML | Refills: 0
Start: 2019-01-17 | End: 2019-01-17

## 2019-01-17 NOTE — PROGRESS NOTES
History of Present Illness     Patient Identification  Therese Alaniz is a 62 y.o. male complains of pain in the bilateral hip and back pain. Also concerned about feelings of electric shock down the legs. Date of Onset: 4/2018, but worsened   Mechanism of Injury: No NATE  Alleviating Factors: Nothing   Aggravating Factors: Bending and standing for a long period of time     Imaging: CT of the spine, which I reviewed    No incontinence reported, no perirectal numbness. Hx of subarachnoid hemorrhage 2004, but no focal residual deficits reported. Had a fall 2 days ago while getting up from sofa after loosing balance, fell on tile floor on left hip. No past medical history on file. No family history on file. Current Outpatient Medications   Medication Sig Dispense Refill    triamcinolone acetonide (KENALOG) 40 mg/mL injection 1 mL by IntraBURSal route once for 1 dose. 1 mL 0    lidocaine (XYLOCAINE) 10 mg/mL (1 %) injection 3 mL by IntraBURSal route once for 1 dose. 3 mL 0    triamcinolone acetonide (KENALOG) 40 mg/mL injection 1 mL by IntraBURSal route once for 1 dose. 1 mL 0    lidocaine (XYLOCAINE) 10 mg/mL (1 %) injection 3 mL by IntraBURSal route once for 1 dose. 3 mL 0    triamcinolone acetonide (KENALOG) 40 mg/mL injection 1 mL by IntraBURSal route once for 1 dose. 1 mL 0    lidocaine (XYLOCAINE) 10 mg/mL (1 %) injection 3 mL by IntraBURSal route once for 1 dose. 3 mL 0    lidocaine (XYLOCAINE) 10 mg/mL (1 %) injection 5 mL by SubCUTAneous route once for 1 dose. 3 mL 0    amLODIPine (NORVASC) 5 mg tablet Take 1 Tab by mouth daily. 30 Tab 2    fluvastatin (LESCOL) 20 mg capsule Take 1 Cap by mouth nightly. 30 Cap 2    lisinopril (PRINIVIL, ZESTRIL) 10 mg tablet Take 1 Tab by mouth daily. 30 Tab 1    naproxen (NAPROSYN) 500 mg tablet Take 1 Tab by mouth two (2) times daily (with meals).  30 Tab 0     Allergies   Allergen Reactions    Azithromycin Hives and Itching       Review of Systems  A comprehensive review of systems was negative except for that written in the HPI. Physical Exam     Visit Vitals  /89 (BP 1 Location: Right arm, BP Patient Position: Sitting)   Pulse 96   Temp 97.9 °F (36.6 °C) (Oral)   Resp 18   Ht 5' 4\" (1.626 m)   Wt 201 lb (91.2 kg)   SpO2 99%   BMI 34.50 kg/m²       General: Alert and oriented and in no acute distress.  Responds to all questions appropriately  LUNGS: Respirations unlabored  Skin: No obvious rash    MSK:    Posture: Normal   Deformity: None    ROM:     Lumbar Flexion: Limited but able to flex about 50 degrees    Lumbar Extension: Normal but painful         Gait: Antalgic     Palpation:    L1-L5: Tenderness mainly in the L4-L5 regions    Sacrum: No Tenderness    Coccyx: No Tenderness    Paraspinal:   Left: Tenderness Right: Tenderness    Sacroiliac Joint:  Left: Tenderness Right: Tenderness    Piriformis Muscle:  Left: No Tenderness Right: No Tenderness    Greater Trochanter:   Left: No Tenderness Right: Tenderness    Ischial Tuberosity:  Left: No Tenderness Right: No Tenderness      Strength (0-5/5)    Hip Flexion:   Left: 5/5  Right: 5/5    Hip Extension:   Left: 5/5  Right: 5/5    Hip Abduction:  Left: 5/5  Right: 5/5    Hip Adduction:   Left: 5/5  Right: 5/5    Knee Extension:  Left: 5/5  Right: 5/5    Knee Flexion:   Left: 5/5  Right: 5/5    Ankle dorsiflexion:  Left: 5/5  Right: 5/5    Ankle plantarflexion:  Left: 5/5  Right: 5/5    Great toe extension:  Left: 5/5  Right: 5/5     Sensation: L4-S1 intact, no deficits noted     DTR:    Patella:  Left: +2  Right: +2    Achilles:  Left: +2  Right: +2     Special test:    Straight leg:  Left: Negative  Right: Negative    JOSE:  Left: Positive  right: Positive    FADIR:  Left: Negative  Right: Negative    Piriformis:  Left: Negative  Right: Negative    Stinchfield:  Left: Negative  Right: Negative     Popliteal angle bilaterally at 80 degrees                [facility]  OFFICE PROCEDURE PROGRESS NOTE        Chart reviewed for the following:   Casimir Blizzard, MD, have reviewed the History, Physical and updated the Allergic reactions for [address] performed immediately prior to start of procedure:   Casimir Blizzard, MD, have performed the following reviews on [facility] prior to the start of the procedure:            * Patient was identified by name and date of birth   * Agreement on procedure being performed was verified  * Risks and Benefits explained to the patient  * Procedure site verified and marked as necessary  * Patient was positioned for comfort  * Consent was signed and verified     Time: 2:08pm      Date of procedure: 1/17/2019    Procedure performed by:  Maribel Sultana MD    Provider assisted by: Odilon Mckeon LPN    Patient assisted by: wife    Indications:   Pain    Procedure:  After verbal consent was obtained, risks and benefits of the procedure were discussed with the patient and alternatives discussed. Time out performed, cross checking patient ID and procedure. Confirmed that the patient does not have history of prior adverse reactions, active infections, or relevant allergies. There was no effusion, erythema, or warmth, and the skin was clear. The skin was cleaned using chlorohexadine x 2. Topical anesthesia was achieved with ethyl chloride. A 22 gauge needle was inserted into the greater trochanteric bursa via a lateral approach. The site was injected with a mixture of definitely mg of Kenalog and  3ml 1% Lidocaine. The injection was completed without complication, and a bandage was applied. Patient felt 50 % better after injection. Ultrasound Guided bilateral sacroiliac joints injection    Treatment options discussed with patient at length, including risks, benefits, alternatives, and the nature of any potential procedures for the problem.     Using the [employer] system, I performed a MSK US guided injection using a curved linear probe into the above target via an in-plane approach after Chlorhexidine prep and needle localization with the linear Probe using a 22 G needle, injecting 40 mg Kenalog and 3 ml Lidocaine 1% w/o Epi. Pt reported 50 percent relief of symptoms after injection. The injection was performed for diagnostic and prognostic purposes. Procedure:  After verbal consent was obtained, risks and benefits of the procedure were discussed with the patient and alternatives discussed. Time out performed, cross checking patient ID and procedure. Confirmed that the patient does not have history of prior adverse reactions, active infections, or relevant allergies. There was no effusion, erythema, or warmth, and the skin was clear. The skin was cleaned using chlorohexadine x 2. Topical anesthesia was achieved with ethyl chloride. A 25 gauge needle was inserted into 3 trigger points the lumbar paraspinal regions. The site was injected with a mixture of 2ml 1% Lidocaine and 3mL of D5. The injection was completed without complication, and a bandage was applied. Patient felt 50% better after injection. After Care Instructions: The patient is asked to continue to rest the joint for a few more days before resuming regular activities. It may be more painful for the first 1-2 days. Watch for fever, or increased swelling or persistent pain in the joint. Call or return to clinic prn if such symptoms occur or there is failure to improve as anticipated. This man is a course hope that he will be helps      Assessment:    ICD-10-CM ICD-9-CM    1. Neuropathy G62.9 355.9 TSH RFX ON ABNORMAL TO FREE T4      VITAMIN B12   2.  Sacroiliac joint dysfunction of both sides M53.3 724.6 REFERRAL TO PHYSICAL THERAPY      TRIAMCINOLONE ACETONIDE INJ      triamcinolone acetonide (KENALOG) 40 mg/mL injection      lidocaine (XYLOCAINE) 10 mg/mL (1 %) injection      TRIAMCINOLONE ACETONIDE INJ triamcinolone acetonide (KENALOG) 40 mg/mL injection      lidocaine (XYLOCAINE) 10 mg/mL (1 %) injection      AMB POS US DRAIN/INJECT LARGE JOINT/BURSA   3. Trochanteric bursitis of right hip M70.61 726.5 REFERRAL TO PHYSICAL THERAPY      TRIAMCINOLONE ACETONIDE INJ      triamcinolone acetonide (KENALOG) 40 mg/mL injection      lidocaine (XYLOCAINE) 10 mg/mL (1 %) injection      SC DRAIN/INJECT LARGE JOINT/BURSA   4. Myofascial muscle pain M79.18 729.1 lidocaine (XYLOCAINE) 10 mg/mL (1 %) injection      INJECT TRIGGER POINTS, > 3       Plan:  Patient noted with significant hamstring tightness, paraspinal pain, and core deficiencies. Bilateral Trendelenburg noted. He does need to increase his strength in his hip pelvic region specifically to include meat. Also noted to have positive Tinel's along the abductor canal return for operating nerve/saphenous nerve entrapment. We will get labs to assess for systemic causes of his pain. Focus on physical therapy, home strengthening and stretching. Follow-up in 6 weeks if no improvement consider hydrodissection of at the abductor canal.    After Care Instructions: The patient is asked to continue to rest the joint for a few more days before resuming regular activities. It may be more painful for the first 1-2 days. Watch for fever, or increased swelling or persistent pain in the joint. Call or return to clinic prn if such symptoms occur or there is failure to improve as anticipated. Follow-up Disposition:  Return in about 6 weeks (around 2/28/2019) for Back Pain.

## 2019-01-17 NOTE — PATIENT INSTRUCTIONS
Hip Bursitis: Care Instructions  Your Care Instructions    Bursitis is inflammation of the bursa. A bursa is a small sac of fluid that cushions a joint and helps it move easily. A bursa sits between a bone in the hip and the muscles and tendons in the thigh and buttock. Injury or overuse of the hip can cause bursitis. Activities that can lead to bursitis include twisting and rapid joint movement. Bursitis can cause hip pain. Bursitis usually gets better if you avoid the activity that caused it. If pain lasts or gets worse despite home treatment, your doctor may draw fluid from the bursa through a needle. This may relieve your pain and help your doctor know if you have an infection. If so, your doctor will prescribe antibiotics. If you have inflammation only, you may get a corticosteroid shot to reduce swelling and pain. Sometimes surgery is needed to drain or remove the bursa. Follow-up care is a key part of your treatment and safety. Be sure to make and go to all appointments, and call your doctor if you are having problems. It's also a good idea to know your test results and keep a list of the medicines you take. How can you care for yourself at home? · Put ice or a cold pack on your hip for 10 to 20 minutes at a time. Put a thin cloth between the ice and your skin. · After 3 days of using ice, you may use heat on your hip. You can use a hot water bottle, a heating pad set on low, or a warm, moist towel. · Rest your hip. Stop any activities that cause pain. Switch to activities that do not stress your hip. · Take your medicines exactly as prescribed. Call your doctor if you think you are having a problem with your medicine. · Ask your doctor if you can take an over-the-counter pain medicine, such as acetaminophen (Tylenol), ibuprofen (Advil, Motrin), or naproxen (Aleve). Be safe with medicines. Read and follow all instructions on the label.   · To prevent stiffness, gently move the hip joint as much as you can without pain every day. As the pain gets better, keep doing range-of-motion exercises. Ask your doctor for exercises that will make the muscles around the hip joint stronger. Do these as directed. · You can slowly return to the activity that caused the pain, but do it with less effort until you can do it without pain or swelling. Be sure to warm up before and stretch after you do the activity. When should you call for help? Call your doctor now or seek immediate medical care if:    · You have a fever.     · You have increased swelling or redness in your hip.     · You cannot use your hip, or the pain in your hip gets worse.    Watch closely for changes in your health, and be sure to contact your doctor if:    · You have pain for 2 weeks or longer despite home treatment. Where can you learn more? Go to http://tracy-tana.info/. Enter Y811 in the search box to learn more about \"Hip Bursitis: Care Instructions. \"  Current as of: November 29, 2017  Content Version: 11.8  © 2576-1021 [employer]. Care instructions adapted under license by [employer] (which disclaims liability or warranty for this information). If you have questions about a medical condition or this instruction, always ask your healthcare professional. [address] any warranty or liability for your use of this information. Learning About Trigger Point Injections  What are trigger point injections? A trigger point is a painful knot in a tight band of muscle. A trigger point often causes pain to be felt in other areas, too. For example, a trigger point in the neck or upper back can cause pain in the head. Trigger point injections are shots of medicine into these knots to help relieve the pain. The medicines are usually local anesthetics like lidocaine.   Trigger point injections are often part of plan that includes other treatments, such as muscle stretching and strengthening. How is a trigger point injection done? Your doctor first locates a trigger point by pressing around the painful area. This may cause your muscle to hurt or twitch. This tells the doctor that he or she has found the spot to do the injection. The area is cleaned. Your doctor then injects the medicine into the trigger point. He or she may inject the medicine in more than one direction within the trigger point. The doctor may change direction without removing the needle. If you have more than one trigger point in the muscle, your doctor may repeat the process. Your doctor may stretch the area to help the muscle relax. He or she may also show you how to move and stretch the muscle yourself. The procedure takes about 10 to 30 minutes. How long it takes depends on how many trigger points are treated. But an injection itself takes only a few moments. What can you expect after a trigger point injection? The area may feel a bit numb for a few hours. It may also feel sore. Other problems from trigger point injections are rare. There is a chance of skin infection at the injection site. And if injections are done in the chest area, there is a small risk of puncturing the outer lining of the lung (pneumothorax). Trigger point injections may reduce some or all of your pain. But the pain can come back after the medicine wears off. If your pain comes back, your doctor may suggest more shots or other treatment for longer-lasting relief. Follow your doctor's instructions carefully. And tell your doctor about any new or unusual symptoms, such as chest pain or shortness of breath. Follow-up care is a key part of your treatment and safety. Be sure to make and go to all appointments, and call your doctor if you are having problems. It's also a good idea to know your test results and keep a list of the medicines you take. Where can you learn more? Go to http://tracy-tana.info/.   Enter O024 in the search box to learn more about \"Learning About Trigger Point Injections. \"  Current as of: November 29, 2017  Content Version: 11.8  © 2597-5382 [employer]. Care instructions adapted under license by [employer] (which disclaims liability or warranty for this information). If you have questions about a medical condition or this instruction, always ask your healthcare professional. [address] any warranty or liability for your use of this information. Sacroiliac Joint Pain: Care Instructions  Your Care Instructions    The sacroiliac joints connect the spine and each side of the pelvis. These joints bear the weight and stress of your torso. This makes them easy to injure. Injury or overuse of these joints may cause low back pain. Stress on these joints can cause joint pain. Sacroiliac joint pain is more common in pregnant women. Certain kinds of arthritis also may cause this type of joint pain. Home treatment may help you feel better. So can avoiding activities that stress your back. Your doctor also may recommend physical therapy. This may include doing exercises and stretches to help with pain. You may also learn to use good posture. Follow-up care is a key part of your treatment and safety. Be sure to make and go to all appointments, and call your doctor if you are having problems. It's also a good idea to know your test results and keep a list of the medicines you take. How can you care for yourself at home? · Ask your doctor about light exercises that may help your back pain. Try to do light activity throughout the day. But make sure to take rests as needed. Find a comfortable position for rest, but don't stay in one position for too long. Avoid activities that cause pain. · To apply heat, put a warm water bottle, a heating pad set on low, or a warm cloth on your back. Do not go to sleep with a heating pad on your skin.   · Put ice or a cold pack on your back for 10 to 20 minutes at a time. Put a thin cloth between the ice and your skin. · If the doctor gave you a prescription medicine for pain, take it as prescribed. · If you are not taking a prescription pain medicine, ask your doctor if you can take an over-the-counter pain medicine, such as acetaminophen (Tylenol), ibuprofen (Advil, Motrin), or naproxen (Aleve). Read and follow all instructions on the label. Take pain medicines exactly as directed. · Do not take two or more pain medicines at the same time unless the doctor told you to. Many pain medicines have acetaminophen, which is Tylenol. Too much acetaminophen (Tylenol) can be harmful. · To prevent future back pain, do exercises to stretch and strengthen your back and stomach. Learn how to use good posture, safe lifting techniques, and proper body mechanics. When should you call for help? Call 911 anytime you think you may need emergency care. For example, call if:    · You are unable to move a leg at all.   [facility] your doctor now or seek immediate medical care if:    · You have new or worse symptoms in your legs or buttocks. Symptoms may include:  ? Numbness or tingling. ? Weakness. ? Pain.     · You lose bladder or bowel control.    Watch closely for changes in your health, and be sure to contact your doctor if:    · You are not getting better as expected. Where can you learn more? Go to http://tracy-tana.info/. Enter P842 in the search box to learn more about \"Sacroiliac Joint Pain: Care Instructions. \"  Current as of: November 29, 2017  Content Version: 11.8  © 1230-8351 [employer]. Care instructions adapted under license by [employer] (which disclaims liability or warranty for this information).  If you have questions about a medical condition or this instruction, always ask your healthcare professional. [address] any warranty or liability for your use of this information.

## 2019-01-18 LAB
TSH SERPL DL<=0.005 MIU/L-ACNC: 4.31 UIU/ML (ref 0.45–4.5)
VIT B12 SERPL-MCNC: 460 PG/ML (ref 232–1245)

## 2019-01-18 RX ORDER — NAPROXEN 500 MG/1
TABLET ORAL
Qty: 30 TAB | Refills: 0 | Status: SHIPPED | OUTPATIENT
Start: 2019-01-18 | End: 2019-03-08 | Stop reason: SDUPTHER

## 2019-01-19 ENCOUNTER — TELEPHONE (OUTPATIENT)
Dept: FAMILY MEDICINE CLINIC | Age: 59
End: 2019-01-19

## 2019-01-19 NOTE — TELEPHONE ENCOUNTER
[facility] with [address] [place] [address]       Resident  Note in Brief    Mr. Jennifer Pineda wife calling on behalf of her . She states her  recently got injections for hip pain and since then has presented with a headache. Hip pain has resolved. Headache is in temporal region and is exacerbated with coughing. /91. She was advised to take Mr. Ofelia Rolle to the ER given he has a history of stroke. It is important to rule out temporal arteritis and TIA.        Thai Craig MD  Family Medicine Resident

## 2019-03-04 ENCOUNTER — OFFICE VISIT (OUTPATIENT)
Dept: FAMILY MEDICINE CLINIC | Age: 59
End: 2019-03-04

## 2019-03-04 VITALS
BODY MASS INDEX: 34.28 KG/M2 | TEMPERATURE: 97.9 F | WEIGHT: 200.8 LBS | DIASTOLIC BLOOD PRESSURE: 84 MMHG | HEART RATE: 97 BPM | SYSTOLIC BLOOD PRESSURE: 124 MMHG | HEIGHT: 64 IN | OXYGEN SATURATION: 96 % | RESPIRATION RATE: 16 BRPM

## 2019-03-04 DIAGNOSIS — M53.3 PAIN OF LEFT SACROILIAC JOINT: ICD-10-CM

## 2019-03-04 DIAGNOSIS — M53.3 PAIN OF RIGHT SACROILIAC JOINT: ICD-10-CM

## 2019-03-04 DIAGNOSIS — G57.13 MERALGIA PARESTHETICA OF BOTH LOWER EXTREMITIES: Primary | ICD-10-CM

## 2019-03-04 RX ORDER — LIDOCAINE HYDROCHLORIDE 10 MG/ML
4 INJECTION INFILTRATION; PERINEURAL ONCE
Qty: 2 ML | Refills: 0
Start: 2019-03-04 | End: 2019-03-04

## 2019-03-04 RX ORDER — LIDOCAINE HYDROCHLORIDE 10 MG/ML
2 INJECTION, SOLUTION EPIDURAL; INFILTRATION; INTRACAUDAL; PERINEURAL ONCE
Qty: 2 ML | Refills: 0
Start: 2019-03-04 | End: 2019-03-04

## 2019-03-04 RX ORDER — LIDOCAINE HYDROCHLORIDE 10 MG/ML
4 INJECTION INFILTRATION; PERINEURAL ONCE
Qty: 3 ML | Refills: 0
Start: 2019-03-04 | End: 2019-03-04

## 2019-03-04 RX ORDER — TRIAMCINOLONE ACETONIDE 40 MG/ML
40 INJECTION, SUSPENSION INTRA-ARTICULAR; INTRAMUSCULAR ONCE
Qty: 1 VIAL | Refills: 0
Start: 2019-03-04 | End: 2019-03-04

## 2019-03-04 RX ORDER — TRIAMCINOLONE ACETONIDE 40 MG/ML
40 INJECTION, SUSPENSION INTRA-ARTICULAR; INTRAMUSCULAR ONCE
Qty: 1 ML | Refills: 0
Start: 2019-03-04 | End: 2019-03-04

## 2019-03-04 RX ORDER — LIDOCAINE HYDROCHLORIDE 10 MG/ML
2 INJECTION, SOLUTION EPIDURAL; INFILTRATION; INTRACAUDAL; PERINEURAL ONCE
Qty: 4 ML | Refills: 0
Start: 2019-03-04 | End: 2019-03-04

## 2019-03-04 NOTE — PATIENT INSTRUCTIONS
Sacroiliac Joint Pain: Care Instructions Your Care Instructions The sacroiliac joints connect the spine and each side of the pelvis. These joints bear the weight and stress of your torso. This makes them easy to injure. Injury or overuse of these joints may cause low back pain. Stress on these joints can cause joint pain. Sacroiliac joint pain is more common in pregnant women. Certain kinds of arthritis also may cause this type of joint pain. Home treatment may help you feel better. So can avoiding activities that stress your back. Your doctor also may recommend physical therapy. This may include doing exercises and stretches to help with pain. You may also learn to use good posture. Follow-up care is a key part of your treatment and safety. Be sure to make and go to all appointments, and call your doctor if you are having problems. It's also a good idea to know your test results and keep a list of the medicines you take. How can you care for yourself at home? · Ask your doctor about light exercises that may help your back pain. Try to do light activity throughout the day. But make sure to take rests as needed. Find a comfortable position for rest, but don't stay in one position for too long. Avoid activities that cause pain. · To apply heat, put a warm water bottle, a heating pad set on low, or a warm cloth on your back. Do not go to sleep with a heating pad on your skin. · Put ice or a cold pack on your back for 10 to 20 minutes at a time. Put a thin cloth between the ice and your skin. · If the doctor gave you a prescription medicine for pain, take it as prescribed. · If you are not taking a prescription pain medicine, ask your doctor if you can take an over-the-counter pain medicine, such as acetaminophen (Tylenol), ibuprofen (Advil, Motrin), or naproxen (Aleve). Read and follow all instructions on the label. Take pain medicines exactly as directed. · Do not take two or more pain medicines at the same time unless the doctor told you to. Many pain medicines have acetaminophen, which is Tylenol. Too much acetaminophen (Tylenol) can be harmful. · To prevent future back pain, do exercises to stretch and strengthen your back and stomach. Learn how to use good posture, safe lifting techniques, and proper body mechanics. When should you call for help? Call 911 anytime you think you may need emergency care. For example, call if: 
  · You are unable to move a leg at all.  
[facility] your doctor now or seek immediate medical care if: 
  · You have new or worse symptoms in your legs or buttocks. Symptoms may include: 
? Numbness or tingling. ? Weakness. ? Pain.  
  · You lose bladder or bowel control.  
 Watch closely for changes in your health, and be sure to contact your doctor if: 
  · You are not getting better as expected. Where can you learn more? Go to http://tracyMediaWheeltana.info/. Enter O164 in the search box to learn more about \"Sacroiliac Joint Pain: Care Instructions. \" Current as of: September 20, 2018 Content Version: 11.9 © 1136-6499 [employer]. Care instructions adapted under license by [employer] (which disclaims liability or warranty for this information). If you have questions about a medical condition or this instruction, always ask your healthcare professional. [address] any warranty or liability for your use of this information. Orthostatic Hypotension: Care Instructions Your Care Instructions Orthostatic hypotension is a quick drop in blood pressure. It happens when you get up from sitting or lying down. You may feel faint, lightheaded, or dizzy. When a person sits up or stands up, the body changes the way it pumps blood. This can slow the flow of blood to the brain for a very short time. And that can make you feel lightheaded. Many medicines can cause this problem, especially in older people. Lack of fluids (dehydration) or illnesses such as diabetes or heart disease also can cause it. Follow-up care is a key part of your treatment and safety. Be sure to make and go to all appointments, and call your doctor if you are having problems. It's also a good idea to know your test results and keep a list of the medicines you take. How can you care for yourself at home? · Tell your doctor about any problems you have with your medicines. · If your doctor prescribes medicine to help prevent a low blood pressure problem, take it exactly as prescribed. Call your doctor if you think you are having a problem with your medicine. · Drink plenty of fluids, enough so that your urine is light yellow or clear like water. Choose water and other caffeine-free clear liquids. If you have kidney, heart, or liver disease and have to limit fluids, talk with your doctor before you increase the amount of fluids you drink. · Limit or avoid alcohol and caffeine. · Get up slowly from bed or after sitting for a long time. If you are in bed, roll to your side and swing your legs over the edge of the bed and onto the floor. Push your body up to a sitting position. Wait for a while before you slowly stand up. If you are dizzy or lightheaded, sit or lie down. When should you call for help? Call 911 anytime you think you may need emergency care. For example, call if: 
  · You passed out (lost consciousness).  
 Watch closely for changes in your health, and be sure to contact your doctor if: 
  · You do not get better as expected. Where can you learn more? Go to http://tracy-tana.info/. Enter Z721 in the search box to learn more about \"Orthostatic Hypotension: Care Instructions. \" Current as of: July 22, 2018 Content Version: 11.9 © 8826-2607 [employer].  Care instructions adapted under license by [address] (which disclaims liability or warranty for this information). If you have questions about a medical condition or this instruction, always ask your healthcare professional. [address] any warranty or liability for your use of this information.

## 2019-03-04 NOTE — PROGRESS NOTES
History of Present Illness Patient Identification Fly Casillas is a 62 y.o. male complains of pain in the bilateral lower back pain. Tried Naproxen and ibuprofen with little improvement. Also complaining of numbness and tingling in bilateral anterior thighs. Date of Onset: 4/2018 Mechanism of Injury: No NATE Alleviating Factors: Nothing Aggravating Factors: Bending and standing for a long period of time  
  
Imaging: CT of the spine, which I reviewed 
  
No incontinence reported, no perirectal numbness. No past medical history on file. No family history on file. Current Outpatient Medications Medication Sig Dispense Refill  triamcinolone acetonide (KENALOG-40) 40 mg/mL injection 1 mL by Other route once for 1 dose. 1 Vial 0  
 lidocaine, PF, (XYLOCAINE) 10 mg/mL (1 %) injection 2 mL by Other route once for 1 dose. 2 mL 0  
 triamcinolone acetonide (KENALOG-40) 40 mg/mL injection 1 mL by Other route once for 1 dose. 1 Vial 0  
 lidocaine, PF, (XYLOCAINE) 10 mg/mL (1 %) injection 2 mL by Other route once for 1 dose. 4 mL 0  
 triamcinolone acetonide (KENALOG) 40 mg/mL injection 1 mL by IntraBURSal route once for 1 dose. 1 mL 0  
 lidocaine (XYLOCAINE) 10 mg/mL (1 %) injection 4 mL by Intra artICUlar route once for 1 dose. 2 mL 0  
 triamcinolone acetonide (KENALOG) 40 mg/mL injection 1 mL by Intra artICUlar route once for 1 dose. 1 mL 0  
 lidocaine (XYLOCAINE) 10 mg/mL (1 %) injection 4 mL by Intra artICUlar route once for 1 dose. 3 mL 0  
 naproxen (NAPROSYN) 500 mg tablet TAKE ONE TABLET BY MOUTH TWICE A DAY WITH MEALS 30 Tab 0  
 amLODIPine (NORVASC) 5 mg tablet Take 1 Tab by mouth daily. 30 Tab 2  
 fluvastatin (LESCOL) 20 mg capsule Take 1 Cap by mouth nightly. 30 Cap 2  
 lisinopril (PRINIVIL, ZESTRIL) 10 mg tablet Take 1 Tab by mouth daily. 30 Tab 1 Allergies Allergen Reactions  Azithromycin Hives and Itching Review of Systems A comprehensive review of systems was negative except for that written in the HPI. Physical Exam  
 
Visit Vitals /84 (BP 1 Location: Right arm, BP Patient Position: Standing) Pulse 97 Temp 97.9 °F (36.6 °C) (Oral) Resp 16 Ht 5' 4\" (1.626 m) Wt 200 lb 12.8 oz (91.1 kg) SpO2 96% BMI 34.47 kg/m² General: Alert and oriented and in no acute distress. Responds to all questions appropriately LUNGS: Respirations unlabored Skin: No obvious rash MSK:  
 Posture: Normal 
 Deformity: None ROM:  
  Lumbar Flexion: Normal 
  Lumbar Extension: Normal 
  Lateral bending: Normal  
  Hip Flexion: Normal 
 
 Gait: Normal   
 
 Palpation: L1-L5: No Tenderness Sacrum: No Tenderness Coccyx: No Tenderness Paraspinal:   Left: No Tenderness Right: No Tenderness Sacroiliac Joint:  Left: Tenderness Right: Tenderness Piriformis Muscle:  Left: No Tenderness Right: No Tenderness Greater Trochanter:   Left: No Tenderness Right: No Tenderness Ischial Tuberosity:  Left: No Tenderness Right: No Tenderness Strength (0-5/5) Hip Flexion:   Left: 5/5  Right: 5/5 Hip Extension:   Left: 5/5  Right: 5/5 Hip Abduction:  Left: 5/5  Right: 5/5 Hip Adduction:   Left: 5/5  Right: 5/5 Knee Extension:  Left: 5/5  Right: 5/5 Knee Flexion:   Left: 5/5  Right: 5/5 Ankle dorsiflexion:  Left: 5/5  Right: 5/5 Ankle plantarflexion:  Left: 5/5  Right: 5/5 Great toe extension:  Left: 5/5  Right: 5/5 Sensation: L4-S1 intact, no deficits noted DTR: 
  Patella:  Left: +2  Right: +2 Achilles:  Left: +2  Right: +2 Special test: 
  JOSE:  Left: Positive  Right: Positive FADIR:              Left: Negative  Right: Negative Piriformis:  Left: Negative  Right: Negative Stinchfield:  Left: Negative  Right: Negative Negative Tinel's over the lateral femoral cutaneous nerve bilaterally over the ASIS 
 
 
  
  
 
 
 [address] CTROFFICE PROCEDURE PROGRESS NOTE Chart reviewed for the following: 
 Laura Toro MD, have reviewed the History, Physical and updated the Allergic reactions for [facility] TIME OUT performed immediately prior to start of procedure: 
 IRandal MD, have performed the following reviews on [facility] prior to the start of the procedure: 
         
* Patient was identified by name and date of birth * Agreement on procedure being performed was verified * Risks and Benefits explained to the patient * Procedure site verified and marked as necessary * Patient was positioned for comfort * Consent was signed and verified Time: 3:30pm 
 
 
Date of procedure: 3/4/2019 Procedure performed by:  Randal Quiles MD 
 
Provider assisted by: Romie Jackman LPN Patient assisted by: self Post-procedure pain:   0 Indications:  
Symptom relief from osteoarthritis and peripheral nerve entrapment Lateral femoral cutaneous nerve Block, US guided After discussion of the risks and benefits, the patient elected to proceed with a nerve block and hydrodissection of bilateral lateral femoral cutaneous nerves, and it was confirmed that the patient does not have history of prior adverse reactions, active infections, or relevant allergies. There was no erythema, or excessive warmth, and the skin was clear. Using the [employer] system, I performed a MSK US guided aspiration and/or injection of identified target (indicated above) via an in-plane approach after chlorhexidine prep and needle localization with the linear array transducer probe, using a 22 gauge needle 2 cc lidocaine, 40mg Kenalog and 2cc of D5 at each sight ____50% relief at proximal thigh and 40% relief at saphenous plexus after injection. The injection was completed without complication, and a bandage was applied. The patient tolerated the procedure well and was instructed in routine post-procedure care, including avoidance of strenuous activity for the next 24-48 hours. The patient will call immediately with any signs of infection or allergic reaction. The injection was performed for therapeutic and diagnostic purposes. Ultrasound Guided Left and Right sacroiliac joint Injection Treatment options discussed with patient at length, including risks, benefits, alternatives, and the nature of any potential procedures for the problem. Using the [employer] system, I performed a MSK US guided aspiration and/or injection of the above target via an in-plane approach after Chlorhexidine prep and needle localization with the linear Probe using a 22G needle,injecting 40 mg Kenalog and 3 ml Lidocaine 1% w/o Epi. Pt reported 50 percent relief of symptoms after injection. The injection was performed for diagnostic and therapeutic purposes. Assessment: ICD-10-CM ICD-9-CM 1. Meralgia paresthetica of both lower extremities G57.13 355.1 IR US GUIDE NDL PLACE INJECT NERV BLCK,OTHR PERIPH NERV  
   triamcinolone acetonide (KENALOG-40) 40 mg/mL injection  
   lidocaine, PF, (XYLOCAINE) 10 mg/mL (1 %) injection TRIAMCINOLONE ACETONIDE INJ IR [place] INJECT NERV BLCK,OTHR PERIPH NERV  
   TRIAMCINOLONE ACETONIDE INJ 2. Pain of left sacroiliac joint M53.3 724.6 TRIAMCINOLONE ACETONIDE INJ  
   triamcinolone acetonide (KENALOG) 40 mg/mL injection  
   lidocaine (XYLOCAINE) 10 mg/mL (1 %) injection AMB POS US DRAIN/INJECT LARGE JOINT/BURSA 3. Pain of right sacroiliac joint M53.3 724.6 TRIAMCINOLONE ACETONIDE INJ  
   triamcinolone acetonide (KENALOG) 40 mg/mL injection  
   lidocaine (XYLOCAINE) 10 mg/mL (1 %) injection AMB POS US DRAIN/INJECT LARGE JOINT/BURSA Plan: 
Neuropathic pain and joint pain improved after injection.   He has not started physical therapy as I encouraged him to do at his last visit. I gave him some home exercises to the [facility]. Instructed to do these twice a day and advance as tolerated. Patient understands that I do recommend formal physical therapy over home exercises. Wearing looser clothing and weight loss reviewed. Patient also noted to be orthostatic today. Encouraged to drink plenty of fluids. Will follow up with his primary care doctor in about a month. If any concerns or worsening symptoms return immediately. Patient encounter was at least 25 minutes with more than 50 percent of the visit involved in counseling After Care Instructions: The patient is asked to continue to rest the joint for a few more days before resuming regular activities. It may be more painful for the first 1-2 days. Watch for fever, or increased swelling or persistent pain in the joint. Call or return to clinic prn if such symptoms occur or there is failure to improve as anticipated. Follow-up Disposition: 
Return in about 1 month (around 4/4/2019) for Orthostatic hypotension.

## 2019-03-08 DIAGNOSIS — G89.29 CHRONIC BILATERAL LOW BACK PAIN WITH BILATERAL SCIATICA: ICD-10-CM

## 2019-03-08 DIAGNOSIS — M54.41 CHRONIC BILATERAL LOW BACK PAIN WITH BILATERAL SCIATICA: ICD-10-CM

## 2019-03-08 DIAGNOSIS — I10 ESSENTIAL HYPERTENSION: ICD-10-CM

## 2019-03-08 DIAGNOSIS — M54.42 CHRONIC BILATERAL LOW BACK PAIN WITH BILATERAL SCIATICA: ICD-10-CM

## 2019-03-08 RX ORDER — AMLODIPINE BESYLATE 5 MG/1
TABLET ORAL
Qty: 30 TAB | Refills: 0 | Status: SHIPPED | OUTPATIENT
Start: 2019-03-08 | End: 2019-04-01 | Stop reason: SDUPTHER

## 2019-03-10 RX ORDER — NAPROXEN 500 MG/1
TABLET ORAL
Qty: 30 TAB | Refills: 0 | Status: SHIPPED | OUTPATIENT
Start: 2019-03-10 | End: 2019-07-24

## 2019-04-01 ENCOUNTER — OFFICE VISIT (OUTPATIENT)
Dept: FAMILY MEDICINE CLINIC | Age: 59
End: 2019-04-01

## 2019-04-01 VITALS
DIASTOLIC BLOOD PRESSURE: 94 MMHG | SYSTOLIC BLOOD PRESSURE: 150 MMHG | OXYGEN SATURATION: 97 % | TEMPERATURE: 97.6 F | RESPIRATION RATE: 16 BRPM | HEART RATE: 83 BPM | BODY MASS INDEX: 33.97 KG/M2 | WEIGHT: 199 LBS | HEIGHT: 64 IN

## 2019-04-01 DIAGNOSIS — M53.3 PAIN OF BOTH SACROILIAC JOINTS: Primary | ICD-10-CM

## 2019-04-01 DIAGNOSIS — G57.13 MERALGIA PARESTHETICA OF BOTH LOWER EXTREMITIES: ICD-10-CM

## 2019-04-01 DIAGNOSIS — N40.1 BENIGN PROSTATIC HYPERPLASIA WITH URINARY HESITANCY: ICD-10-CM

## 2019-04-01 DIAGNOSIS — I10 ESSENTIAL HYPERTENSION: ICD-10-CM

## 2019-04-01 DIAGNOSIS — F32.A DEPRESSION, UNSPECIFIED DEPRESSION TYPE: ICD-10-CM

## 2019-04-01 DIAGNOSIS — R39.11 BENIGN PROSTATIC HYPERPLASIA WITH URINARY HESITANCY: ICD-10-CM

## 2019-04-01 LAB
ALBUMIN UR QL STRIP: 10 MG/L
BILIRUB UR QL STRIP: NEGATIVE
CREATININE, URINE POC: 50 MG/DL
GLUCOSE UR-MCNC: NEGATIVE MG/DL
KETONES P FAST UR STRIP-MCNC: NEGATIVE MG/DL
MICROALBUMIN/CREAT RATIO POC: <30 MG/G
PH UR STRIP: 5 [PH] (ref 4.6–8)
PROT UR QL STRIP: NEGATIVE
SP GR UR STRIP: 1 (ref 1–1.03)
UA UROBILINOGEN AMB POC: NORMAL (ref 0.2–1)
URINALYSIS CLARITY POC: CLEAR
URINALYSIS COLOR POC: YELLOW
URINE BLOOD POC: NEGATIVE
URINE LEUKOCYTES POC: NEGATIVE
URINE NITRITES POC: NEGATIVE

## 2019-04-01 RX ORDER — CHOLECALCIFEROL (VITAMIN D3) 125 MCG
CAPSULE ORAL
COMMUNITY

## 2019-04-01 RX ORDER — FLUOXETINE 10 MG/1
10 CAPSULE ORAL DAILY
Qty: 30 CAP | Refills: 1 | Status: SHIPPED | OUTPATIENT
Start: 2019-04-01 | End: 2019-05-06 | Stop reason: SDUPTHER

## 2019-04-01 RX ORDER — AMLODIPINE BESYLATE 5 MG/1
TABLET ORAL
Qty: 30 TAB | Refills: 0 | Status: SHIPPED | OUTPATIENT
Start: 2019-04-01 | End: 2019-04-19 | Stop reason: DRUGHIGH

## 2019-04-01 RX ORDER — LISINOPRIL 10 MG/1
10 TABLET ORAL DAILY
Qty: 30 TAB | Refills: 1 | Status: SHIPPED | OUTPATIENT
Start: 2019-04-01 | End: 2019-06-06 | Stop reason: SDUPTHER

## 2019-04-01 RX ORDER — TAMSULOSIN HYDROCHLORIDE 0.4 MG/1
0.4 CAPSULE ORAL DAILY
Qty: 30 CAP | Refills: 0 | Status: SHIPPED | OUTPATIENT
Start: 2019-04-01 | End: 2019-05-06 | Stop reason: SDUPTHER

## 2019-04-01 RX ORDER — ASPIRIN 81 MG/1
TABLET ORAL DAILY
COMMUNITY
End: 2019-07-24

## 2019-04-01 NOTE — PATIENT INSTRUCTIONS
Benign Prostatic Hyperplasia: Care Instructions  Your Care Instructions    Benign prostatic hyperplasia, or BPH, is an enlarged prostate gland. The prostate is a small gland that makes some of the fluid in semen. Prostate enlargement happens to almost all men as they age. It is usually not serious. BPH does not cause prostate cancer. As the prostate gets bigger, it may partly block the flow of urine. You may have a hard time getting a urine stream started or completely stopped. BPH can cause dribbling. You may have a weak urine stream, or you may have to urinate more often than you used to, especially at night. Most men find these problems easy to manage. You do not need treatment unless your symptoms bother you a lot or you have other problems, such as bladder infections or stones. In these cases, medicines may help. Surgery is not needed unless the urine flow is blocked or the symptoms do not get better with medicine. Follow-up care is a key part of your treatment and safety. Be sure to make and go to all appointments, and call your doctor if you are having problems. It's also a good idea to know your test results and keep a list of the medicines you take. How can you care for yourself at home? · Take plenty of time to urinate. Try to relax. · Try \"double voiding. \" Urinate as much you can, relax for a few moments, and then try to urinate again. · Sit on the toilet to urinate. · Read or think of other things while you are waiting. · Turn on a faucet, or try to picture running water. Some men find that this helps get their urine flowing. · If dribbling is a problem, wash your penis daily to avoid skin irritation and infection. · Avoid caffeine and alcohol. These drinks will increase how often you need to urinate. Spread your fluid intake throughout the day. If the urge to urinate often wakes you at night, limit your fluid intake in the evening. Urinate right before you go to bed.   · Many over-the-counter cold and allergy medicines can make the symptoms of BPH worse. Avoid antihistamines, decongestants, and allergy pills, if you can. Read the warnings on the package. · If you take any prescription medicines, especially tranquilizers or antidepressants, ask your doctor or pharmacist whether they can cause urination problems. There may be other medicines you can use that do not cause urinary problems. · Be safe with medicines. Take your medicines exactly as prescribed. Call your doctor if you think you are having a problem with your medicine. When should you call for help? Call your doctor now or seek immediate medical care if:    · You cannot urinate at all.     · You have symptoms of a urinary infection. For example:  ? You have blood or pus in your urine. ? You have pain in your back just below your rib cage. This is called flank pain. ? You have a fever, chills, or body aches. ? It hurts to urinate. ? You have groin or belly pain.    Watch closely for changes in your health, and be sure to contact your doctor if:    · It hurts when you ejaculate.     · Your urinary problems get a lot worse or bother you a lot. Where can you learn more? Go to http://tracy-tana.info/. Enter D384 in the search box to learn more about \"Benign Prostatic Hyperplasia: Care Instructions. \"  Current as of: September 26, 2018  Content Version: 11.9  © 2534-1040 [employer]. Care instructions adapted under license by [employer] (which disclaims liability or warranty for this information). If you have questions about a medical condition or this instruction, always ask your healthcare professional. [address] any warranty or liability for your use of this information. Tamsulosin (By mouth)   Tamsulosin Hydrochloride (pid-PDF-uif-sin daniel-droe-KLOR-frank)  Treats benign prostatic hyperplasia (enlarged prostate).    Brand Name(s): Flomax   There may be other brand names for this medicine. When This Medicine Should Not Be Used: This medicine is not right for everyone. Do not use it if you had an allergic reaction to tamsulosin. How to Use This Medicine:   Capsule  · Take your medicine as directed. Your dose may need to be changed several times to find what works best for you. · Take this medicine 30 minutes after the same meal each day. Swallow the capsule whole. Do not crush, chew, or open it. · Read and follow the patient instructions that come with this medicine. Talk to your doctor or pharmacist if you have any questions. · Missed dose: Take a dose as soon as you remember. If it is almost time for your next dose, wait until then and take a regular dose. Do not take extra medicine to make up for a missed dose. If you forget to take this medicine for several days in a row, talk to your doctor before you start taking it again. · Store the medicine in a closed container at room temperature, away from heat, moisture, and direct light. Drugs and Foods to Avoid:   Ask your doctor or pharmacist before using any other medicine, including over-the-counter medicines, vitamins, and herbal products. · Some medicines can affect how tamsulosin works. Tell your doctor if you are using another alpha blocker medicine, cimetidine, erythromycin, ketoconazole, paroxetine, terbinafine, warfarin, or medicine for erectile dysfunction. Warnings While Using This Medicine:   · Tell your doctor if you have kidney disease, liver disease, low blood pressure, prostate cancer, or an allergy to sulfa drugs. · Tell your doctor if you plan to have cataract or glaucoma surgery. This medicine may cause eye problems during surgery. · This medicine may make you dizzy or lightheaded. Do not drive or do anything else that could be dangerous until you know how this medicine affects you. Stand or sit up slowly if you feel dizzy.   · Your doctor will check your progress and the effects of this medicine at regular visits. Keep all appointments. · Keep all medicine out of the reach of children. Never share your medicine with anyone. Possible Side Effects While Using This Medicine:   Call your doctor right away if you notice any of these side effects:  · Allergic reaction: Itching or hives, swelling in your face or hands, swelling or tingling in your mouth or throat, chest tightness, trouble breathing  · Blistering, peeling, red skin rash  · Lightheadedness, dizziness, fainting  · Painful, prolonged erection of your penis  If you notice these less serious side effects, talk with your doctor:   · Headache  · Problems with ejaculation  · Runny or stuffy nose  If you notice other side effects that you think are caused by this medicine, tell your doctor. Call your doctor for medical advice about side effects. You may report side effects to FDA at 1-188-FDA-8828  © 2017 [facility] Information is for End User's use only and may not be sold, redistributed or otherwise used for commercial purposes. The above information is an  only. It is not intended as medical advice for individual conditions or treatments. Talk to your doctor, nurse or pharmacist before following any medical regimen to see if it is safe and effective for you. Fluoxetine (By mouth)   Fluoxetine (mcaa-YR-j-teen)  Treats depression, obsessive-compulsive disorder (OCD), bulimia nervosa, and panic disorder. This medicine is an SSRI. Brand Name(s): FLUoxetine HCl, PROzac, PROzac Weekly, Sarafem   There may be other brand names for this medicine. When This Medicine Should Not Be Used: This medicine is not right for everyone. Do not use it if you had an allergic reaction to fluoxetine. How to Use This Medicine:   Capsule, Delayed Release Capsule, Liquid, Tablet  · Take your medicine as directed. Your dose may need to be changed several times to find what works best for you.  Take your medicine at the same time each day. · You may need to take this medicine for a month or longer before you feel better. If you feel that the medicine is not working well, tell your doctor right away. Do not take more than your normal dose. · Measure the oral liquid medicine with a marked measuring spoon, oral syringe, or medicine cup. · Delayed-release capsule: Swallow whole. Do not crush, break, or chew it. · This medicine should come with a Medication Guide. Ask your pharmacist for a copy if you do not have one. · Missed dose:   ¨ Every day dose (Prozac® or Sarafem®): If you miss a dose or forget to take your medicine, take it as soon as you can. If it is almost time for your next dose, wait until then to take the medicine and skip the missed dose. Do not use extra medicine to make up for a missed dose. ¨ Once-a-week dose [facility]): If you miss a dose or forget to take your medicine, take it as soon as you can. Then go back to your regular schedule the next week. Do not use extra medicine to make up for a missed dose. · Store the medicine in a closed container at room temperature, away from heat, moisture, and direct light. Drugs and Foods to Avoid:   Ask your doctor or pharmacist before using any other medicine, including over-the-counter medicines, vitamins, and herbal products. · Do not use this medicine together with pimozide or thioridazine. Do not use this medicine within 14 days of using an MAO inhibitor, and do not start an MAOI for at least 5 weeks after you stop using fluoxetine. · Some medicines can affect how fluoxetine works.  Tell your doctor if you are using any of the following:  ¨ Buspirone, carbamazepine, dolasetron, erythromycin, fentanyl, gatifloxacin, lithium, mefloquine, methadone, moxifloxacin, pentamidine, phenytoin, probucol, Maite's wort, tacrolimus, tramadol, tryptophan supplement, or vinblastine  ¨ Amphetamines  ¨ Blood thinner (including warfarin)  ¨ Diuretic (water pill)  ¨ Medicine for heart rhythm problem  ¨ Medicine to treat mental illness (including chlorpromazine, droperidol, iloperidone, ziprasidone)  ¨ NSAID pain or arthritis medicine (including aspirin, celecoxib, diclofenac, ibuprofen, naproxen)  ¨ Phenothiazine medicine  ¨ Triptan medicine to treat migraine headache  · Do not drink alcohol while you are using this medicine. · Tell your doctor if you use anything else that makes you sleepy. Some examples are allergy medicine, narcotic pain medicine, and alcohol. Warnings While Using This Medicine:   · Tell your doctor if you are pregnant or breastfeeding, or if you have kidney disease, liver disease, bleeding problems, diabetes, glaucoma, or a history of seizures. Tell your doctor if you have had heart disease, a heart rhythm problem (such as QT prolongation), heart attack, heart failure, low blood pressure, or a stroke. · For some children, teenagers, and young adults, this medicine may increase mental or emotional problems. This may lead to thoughts of suicide and violence. Talk with your doctor right away if you have any thoughts or behavior changes that concern you. Tell your doctor if you or anyone in your family has a history of bipolar disorder or suicide attempts. · This medicine may cause the following problems:  ¨ Serotonin syndrome (may be life-threatening when used with certain other medicines)  ¨ Higher risk of bleeding  ¨ Low sodium levels in the blood  ¨ Heart rhythm changes  · Do not stop using this medicine suddenly. Your doctor will need to slowly decrease your dose before you stop it completely. · This medicine may make you dizzy or drowsy. Do not drive or do anything else that could be dangerous until you know how this medicine affects you. · Keep all medicine out of the reach of children. Never share your medicine with anyone.   Possible Side Effects While Using This Medicine:   Call your doctor right away if you notice any of these side effects:  · Allergic reaction: Itching or hives, swelling in your face or hands, swelling or tingling in your mouth or throat, chest tightness, trouble breathing  · Anxiety, restlessness, fever, sweating, muscle spasms, nausea, vomiting, diarrhea, seeing or hearing things that are not there  · Confusion, weakness, and muscle twitching  · Eye pain, trouble seeing, blurry vision  · Fast, pounding, or uneven heartbeat, dizziness  · Seizures  · Skin rash, blisters, peeling, or redness  · Trouble breathing  · Unusual behavior, thoughts of hurting yourself or others, feeling more excited or energetic than usual, trouble sleeping  · Unusual bleeding or bruising  If you notice these less serious side effects, talk with your doctor:   · Diarrhea, changes in appetite, weight gain or loss  · Dry mouth  · Sleepiness or unusual drowsiness, unusual dreams  · Sexual problems  If you notice other side effects that you think are caused by this medicine, tell your doctor. Call your doctor for medical advice about side effects. You may report side effects to FDA at 6-355-FDA-7784  © 2017 [facility] Information is for End User's use only and may not be sold, redistributed or otherwise used for commercial purposes. The above information is an  only. It is not intended as medical advice for individual conditions or treatments. Talk to your doctor, nurse or pharmacist before following any medical regimen to see if it is safe and effective for you.

## 2019-04-01 NOTE — PROGRESS NOTES
[address] [facility] [address] [place] [address]   Office [phone], Fax [phone]    Subjective:     Chief Complaint   Patient presents with    Back Pain    Sad     wife states pt speaks of suicide daily     History provided by patient     HPI:  Shane Orantes is a 62 y.o.  male presents for back pain follow up. Significant history pertinent to presentation includes b/l meralgia paresthetica, pain of sacroiliac joint. Meralgia paresthesia, sacroiliac joint.   - Pt seen by our sports medicine specialist. First injection helped for 3 wks but the second only lasted for 2 days. Pt has not gone physical therapy,  been doing the exercises at home 2-3 times per day (10 times for each exercise). Tried Naproxen and aleeve. Sad.   - Mainly associated with pain. Pt unable to sleep and had mentions of SI to his wife. He says he has no SI today and it was more mood swing with the pain. No plans for SI. Interested in treatment. Urinary urgency with intermittent burning sensation with urination.   - Pt has difficulty urinating since 2006, difficulty starting and also stops in between. Does not feel like he is able fully empty bladder. Thinks he has enlarged prostate. Denies fever/chills, penile discharge, unintended weight loss, sensation difference. Pt is able to control urine/bowel. Medication reviewed. Allergy reviewed.     ROS (bolded are positive):   General Negative for fever, chills, changes in weight, changes in appetite   CV Negative for chest pain, palpitations, edema   Respiratory Negative for cough, shortness of breath, wheezing   GI Negative for change in bowel habits, abdominal pain, black or bloody stools, nausea or vomiting    Negative for frequency, dysuria, hematuria, penile discharge   MSK Negative for back pain, joint pain, muscle pain   Skin Negative for itching, rash, hives   Neuro Negative for dizziness, headache, confusion, weakness   Psych Negative for anxiety, depression, change in mood     Objective:   Vitals - reviewed  Visit Vitals  BP (!) 150/94   Pulse 83   Temp 97.6 °F (36.4 °C) (Oral)   Resp 16   Ht 5' 4\" (1.626 m)   Wt 199 lb (90.3 kg)   SpO2 97%   BMI 34.16 kg/m²        Physical exam:   GEN: NAD. Alert. Well nourished. obese  NECK: Supple; no masses; thyroid normal           LUNGS: Respirations unlabored; CTAB. no wheeze, rales, rhonchi   CARDIOVASCULAR: Regular, rate, and rhythm without murmurs, gallops or rubs   NEUROLOGIC:  No focal neurologic deficits. Strength and sensation grossly intact. ABDOMEN: soft, NT, ND, no rebound tenderness. No rebound tenderness. MSK: FROM although limited by pain. Paraspinal tenderness and sacroiliac joint pain. EXT: Well perfused. No edema. No erythema. PSYCH: mood related to pain. Appropriate on visit. Good insight and judgement. Cooperative. SKIN: No obvious rashes or lesions. Pertinent Labs/Studies:   - Phq9 = 12. Neg UA    Assessment and orders:       ICD-10-CM ICD-9-CM    1. Pain of both sacroiliac joints M53.3 724.6    2. Meralgia paresthetica of both lower extremities G57.13 355.1    3. Depression, unspecified depression type F32.9 311 FLUoxetine (PROZAC) 10 mg capsule   4. Benign prostatic hyperplasia with urinary hesitancy N40.1 600.01 AMB POC URINALYSIS DIP STICK AUTO W/O MICRO    R39.11 788.64 tamsulosin (FLOMAX) 0.4 mg capsule   5. Essential hypertension I10 401.9 amLODIPine (NORVASC) 5 mg tablet      lisinopril (PRINIVIL, ZESTRIL) 10 mg tablet      AMB POC URINE, MICROALBUMIN, SEMIQUANT (3 RESULTS)     Diagnoses and all orders for this visit:    1. Pain of both sacroiliac joints. Pt has financial limitation and was unable to go to physical therapy. Doing home exercises. Recommended he goes to the physical therapy one time to learn the exercise for maximum benefits. Also advised to follow up with Dr Kandy Duran, if 3rd injection would be helpful vs ortho referral.     2. Meralgia paresthetica of both lower extremities.  Refer to #1.     3. Depression, unspecified depression type. Mainly coming from uncontrolled pain. Started on prozac, but could benefit from amitriptyline vs Cymbalta. Should be covered by insurance once on prozac as well. To reassess on return. No SI/HI. Verbal suicide contract done.   -     FLUoxetine (PROZAC) 10 mg capsule; Take 1 Cap by mouth daily. 4. Benign prostatic hyperplasia with urinary hesitancy. Neg UA. No signs of infection. Will give trial of flomax. No insurance, only has care card. -     AMB POC URINALYSIS DIP STICK AUTO W/O MICRO  -     tamsulosin (FLOMAX) 0.4 mg capsule; Take 1 Cap by mouth daily. 5. Essential hypertension. Discussed to come back in 3 weeks after a better control of pain and also trial of flomax. -     amLODIPine (NORVASC) 5 mg tablet; TAKE ONE TABLET BY MOUTH ONE TIME DAILY  -     lisinopril (PRINIVIL, ZESTRIL) 10 mg tablet; Take 1 Tab by mouth daily. Follow-up and Dispositions    · Return in about 3 days (around 4/4/2019) for evaluation for possible repeat injection vs referral to ortho - Dr Izabela Camarena. Return in 3 weeks for BPH, depression and HTN check       Pt was discussed with Dr Cookie García (attending physician). I have reviewed patient medical and social history and medications. I have reviewed pertinent labs results and other data. I have discussed the diagnosis with the patient and the intended plan as seen in the above orders. The patient has received an after-visit summary and questions were answered concerning future plans. I have discussed medication side effects and warnings with the patient as well.     Alia Carmichael MD  Resident [facility]  04/01/19

## 2019-04-01 NOTE — PROGRESS NOTES
Chief Complaint   Patient presents with    Back Pain    Sad     wife states pt speaks of suicide daily     1. Have you been to the ER, urgent care clinic since your last visit? Hospitalized since your last visit? No    2. Have you seen or consulted any other health care providers outside of the [address] [place] [address] since your last visit? Include any pap smears or colon screening.  No

## 2019-04-08 ENCOUNTER — TELEPHONE (OUTPATIENT)
Dept: FAMILY MEDICINE CLINIC | Age: 59
End: 2019-04-08

## 2019-04-08 ENCOUNTER — APPOINTMENT (OUTPATIENT)
Dept: GENERAL RADIOLOGY | Age: 59
End: 2019-04-08
Attending: EMERGENCY MEDICINE
Payer: SUBSIDIZED

## 2019-04-08 ENCOUNTER — HOSPITAL ENCOUNTER (EMERGENCY)
Age: 59
Discharge: HOME | End: 2019-04-08
Attending: EMERGENCY MEDICINE
Payer: SUBSIDIZED

## 2019-04-08 VITALS
DIASTOLIC BLOOD PRESSURE: 91 MMHG | SYSTOLIC BLOOD PRESSURE: 138 MMHG | HEART RATE: 126 BPM | TEMPERATURE: 98.8 F | WEIGHT: 198 LBS | BODY MASS INDEX: 33.8 KG/M2 | RESPIRATION RATE: 18 BRPM | OXYGEN SATURATION: 96 % | HEIGHT: 64 IN

## 2019-04-08 DIAGNOSIS — R10.84 ABDOMINAL PAIN, GENERALIZED: ICD-10-CM

## 2019-04-08 DIAGNOSIS — G89.29 CHRONIC MIDLINE LOW BACK PAIN WITHOUT SCIATICA: Primary | ICD-10-CM

## 2019-04-08 DIAGNOSIS — M54.50 CHRONIC MIDLINE LOW BACK PAIN WITHOUT SCIATICA: Primary | ICD-10-CM

## 2019-04-08 LAB
ALBUMIN SERPL-MCNC: 3.6 G/DL (ref 3.5–5)
ALBUMIN/GLOB SERPL: 0.9 {RATIO} (ref 1.1–2.2)
ALP SERPL-CCNC: 65 U/L (ref 45–117)
ALT SERPL-CCNC: 32 U/L (ref 12–78)
ANION GAP SERPL CALC-SCNC: 7 MMOL/L (ref 5–15)
APPEARANCE UR: CLEAR
AST SERPL-CCNC: 16 U/L (ref 15–37)
ATRIAL RATE: 119 BPM
BACTERIA URNS QL MICRO: NEGATIVE /HPF
BASOPHILS # BLD: 0.1 K/UL (ref 0–0.1)
BASOPHILS NFR BLD: 0 % (ref 0–1)
BILIRUB SERPL-MCNC: 0.3 MG/DL (ref 0.2–1)
BILIRUB UR QL: NEGATIVE
BUN SERPL-MCNC: 18 MG/DL (ref 6–20)
BUN/CREAT SERPL: 16 (ref 12–20)
CALCIUM SERPL-MCNC: 8.7 MG/DL (ref 8.5–10.1)
CALCULATED P AXIS, ECG09: 48 DEGREES
CALCULATED R AXIS, ECG10: -53 DEGREES
CALCULATED T AXIS, ECG11: 92 DEGREES
CHLORIDE SERPL-SCNC: 103 MMOL/L (ref 97–108)
CO2 SERPL-SCNC: 25 MMOL/L (ref 21–32)
COLOR UR: NORMAL
CREAT SERPL-MCNC: 1.16 MG/DL (ref 0.7–1.3)
DIAGNOSIS, 93000: NORMAL
DIFFERENTIAL METHOD BLD: ABNORMAL
EOSINOPHIL # BLD: 0.1 K/UL (ref 0–0.4)
EOSINOPHIL NFR BLD: 1 % (ref 0–7)
EPITH CASTS URNS QL MICRO: NORMAL /LPF
ERYTHROCYTE [DISTWIDTH] IN BLOOD BY AUTOMATED COUNT: 14.2 % (ref 11.5–14.5)
GLOBULIN SER CALC-MCNC: 3.9 G/DL (ref 2–4)
GLUCOSE SERPL-MCNC: 126 MG/DL (ref 65–100)
GLUCOSE UR STRIP.AUTO-MCNC: NEGATIVE MG/DL
HCT VFR BLD AUTO: 49.2 % (ref 36.6–50.3)
HGB BLD-MCNC: 16.5 G/DL (ref 12.1–17)
HGB UR QL STRIP: NEGATIVE
HYALINE CASTS URNS QL MICRO: NORMAL /LPF (ref 0–5)
IMM GRANULOCYTES # BLD AUTO: 0.1 K/UL (ref 0–0.04)
IMM GRANULOCYTES NFR BLD AUTO: 1 % (ref 0–0.5)
KETONES UR QL STRIP.AUTO: NEGATIVE MG/DL
LEUKOCYTE ESTERASE UR QL STRIP.AUTO: NEGATIVE
LIPASE SERPL-CCNC: 123 U/L (ref 73–393)
LYMPHOCYTES # BLD: 2.7 K/UL (ref 0.8–3.5)
LYMPHOCYTES NFR BLD: 19 % (ref 12–49)
MCH RBC QN AUTO: 30.6 PG (ref 26–34)
MCHC RBC AUTO-ENTMCNC: 33.5 G/DL (ref 30–36.5)
MCV RBC AUTO: 91.3 FL (ref 80–99)
MONOCYTES # BLD: 0.7 K/UL (ref 0–1)
MONOCYTES NFR BLD: 5 % (ref 5–13)
NEUTS SEG # BLD: 10.5 K/UL (ref 1.8–8)
NEUTS SEG NFR BLD: 74 % (ref 32–75)
NITRITE UR QL STRIP.AUTO: NEGATIVE
NRBC # BLD: 0 K/UL (ref 0–0.01)
NRBC BLD-RTO: 0 PER 100 WBC
P-R INTERVAL, ECG05: 138 MS
PH UR STRIP: 6 [PH] (ref 5–8)
PLATELET # BLD AUTO: 250 K/UL (ref 150–400)
PMV BLD AUTO: 9.4 FL (ref 8.9–12.9)
POTASSIUM SERPL-SCNC: 3.8 MMOL/L (ref 3.5–5.1)
PROT SERPL-MCNC: 7.5 G/DL (ref 6.4–8.2)
PROT UR STRIP-MCNC: NEGATIVE MG/DL
Q-T INTERVAL, ECG07: 312 MS
QRS DURATION, ECG06: 74 MS
QTC CALCULATION (BEZET), ECG08: 438 MS
RBC # BLD AUTO: 5.39 M/UL (ref 4.1–5.7)
RBC #/AREA URNS HPF: NORMAL /HPF (ref 0–5)
SODIUM SERPL-SCNC: 135 MMOL/L (ref 136–145)
SP GR UR REFRACTOMETRY: 1.01 (ref 1–1.03)
TROPONIN I SERPL-MCNC: <0.05 NG/ML
UR CULT HOLD, URHOLD: NORMAL
UROBILINOGEN UR QL STRIP.AUTO: 0.2 EU/DL (ref 0.2–1)
VENTRICULAR RATE, ECG03: 119 BPM
WBC # BLD AUTO: 14.2 K/UL (ref 4.1–11.1)
WBC URNS QL MICRO: NORMAL /HPF (ref 0–4)

## 2019-04-08 PROCEDURE — 99284 EMERGENCY DEPT VISIT MOD MDM: CPT

## 2019-04-08 PROCEDURE — 85025 COMPLETE CBC W/AUTO DIFF WBC: CPT

## 2019-04-08 PROCEDURE — 72100 X-RAY EXAM L-S SPINE 2/3 VWS: CPT

## 2019-04-08 PROCEDURE — 74011250636 HC RX REV CODE- 250/636: Performed by: STUDENT IN AN ORGANIZED HEALTH CARE EDUCATION/TRAINING PROGRAM

## 2019-04-08 PROCEDURE — 71046 X-RAY EXAM CHEST 2 VIEWS: CPT

## 2019-04-08 PROCEDURE — 84484 ASSAY OF TROPONIN QUANT: CPT

## 2019-04-08 PROCEDURE — 83690 ASSAY OF LIPASE: CPT

## 2019-04-08 PROCEDURE — 96361 HYDRATE IV INFUSION ADD-ON: CPT

## 2019-04-08 PROCEDURE — 80053 COMPREHEN METABOLIC PANEL: CPT

## 2019-04-08 PROCEDURE — 81001 URINALYSIS AUTO W/SCOPE: CPT

## 2019-04-08 PROCEDURE — 74011250636 HC RX REV CODE- 250/636: Performed by: EMERGENCY MEDICINE

## 2019-04-08 PROCEDURE — 74011250637 HC RX REV CODE- 250/637: Performed by: STUDENT IN AN ORGANIZED HEALTH CARE EDUCATION/TRAINING PROGRAM

## 2019-04-08 PROCEDURE — 93005 ELECTROCARDIOGRAM TRACING: CPT

## 2019-04-08 PROCEDURE — 96374 THER/PROPH/DIAG INJ IV PUSH: CPT

## 2019-04-08 PROCEDURE — 36415 COLL VENOUS BLD VENIPUNCTURE: CPT

## 2019-04-08 RX ORDER — DIAZEPAM 5 MG/1
5 TABLET ORAL
Status: COMPLETED | OUTPATIENT
Start: 2019-04-08 | End: 2019-04-08

## 2019-04-08 RX ORDER — KETOROLAC TROMETHAMINE 30 MG/ML
30 INJECTION, SOLUTION INTRAMUSCULAR; INTRAVENOUS
Status: COMPLETED | OUTPATIENT
Start: 2019-04-08 | End: 2019-04-08

## 2019-04-08 RX ADMIN — KETOROLAC TROMETHAMINE 30 MG: 30 INJECTION, SOLUTION INTRAMUSCULAR at 18:21

## 2019-04-08 RX ADMIN — DIAZEPAM 5 MG: 5 TABLET ORAL at 18:58

## 2019-04-08 RX ADMIN — SODIUM CHLORIDE 1000 ML: 900 INJECTION, SOLUTION INTRAVENOUS at 18:21

## 2019-04-08 NOTE — TELEPHONE ENCOUNTER
Spoke to wife Chela Gao who states patient is right beside her on the phone feeling lightheaded. To patient identifies confirmed by carmencita (name and ). Sarthak Hilton states patient is very weak and looks pale. Wife states patient has been feeling lightheaded, dizziness and weakness. Wife Chela Gao states  patient blood pressure  was 151/103 when taken this morning. Wife states patient looks pale and is weak. Wife states patient has an appointment this evening at 6:15 pm. Wife wanted to know if she should take patient to the hospital. Advised wife Chelaamanda Gao to immediately take patient to the hospital. Wife verbalized understanding.

## 2019-04-08 NOTE — ED TRIAGE NOTES
Patient arrives with several complaints to include SOB, syncope, loss of bowel control, chest pain, pale, diaphoresis, abdominal pain, weakness, intermittent tingling to all four extremities, lower back pain x 1 year with worsening. Patient states most pertinent complaint is mid-lower abdominal bloating/pain. Accompanied by wife.

## 2019-04-08 NOTE — ED PROVIDER NOTES
62 y.o. male past medical history significant for MI, CVA, and cardiac stent placement procedures who presents from home with chief complaint of syncope. Per spouse, patient had an episode of syncope 3 days ago while at his doctor's office. Today, while using the toilet, spouse indicates that the patient had an additional episode of syncope. He currently complains of abdominal pain as well as distention. Additionally, patient mentions experiencing numbness and tingling in his upper and lower extremities bilaterally today as well. He reports that his blood pressure was \"151/103\" today. Per spouse, patient has recently been experiencing increased weakness as well as SOB with exertion. She notes that he is \"sleeping all the time. \"  She mentions that the patient has appeared pale and diaphoretic. Additionally, patient indicates that he has felt \"hot and cold\" and has had an unsteady gait due to dizziness. Of note, spouse mentions that the patient has a previous h/o fecal incontinence. She adds that the patient had chest pain last week but notes that this has since resolved. Spouse indicates that the patient has had similar symptoms of weakness, diaphoresis, pale appearance, fatigue, abdominal pain, back pain, and unsteady gait over the past year but notes that his symptoms have been progressively worsening. Patient admits to previous h/o MI, CVA, and cardiac stent placement. Patient denies chest pain, nausea, vomiting, and diarrhea. There are no other acute medical concerns at this time. Social hx: daily tobacco use; occasional alcohol use; unknown drug use PCP: None 3:25 PM 
I have evaluated the patient as the Provider in Triage. I have reviewed His vital signs and the triage nurse assessment.  I have talked with the patient and any available family and advised that I am the provider in triage and have ordered the appropriate study to initiate their work up based on the clinical presentation during my assessment. I have advised that the patient will be accommodated in the Main ED as soon as possible. I have also requested to contact the triage nurse or myself immediately if the patient experiences any changes in their condition during this brief waiting period. Austin Schultz Note written by Pina Marcum, as dictated by Rowdy Esteves MD 3:25 PM 
 
 
The history is provided by the patient and the spouse. No  was used. Resident MD assumed care of pt 
 
63yo male w/ chronic back pain + meralgia paresthesia, presents w/ syncope,  lower abdominal pain, and worsening lower back pain. Today, he had an episode of bowel incontinence and wife reports he \"passed out on the toilet\". Pt states he has had intermittent bowel incontinence and dysuria for months. Denies saddle anesthesia/urinary incontinence/ED. Denies fevers/chills/nausea/vomiting. + Constipation. Chronic paresthesias unchanged. Pt states that since arriving to the ED, abdominal pain has improved ~6/10 and currently is not having any chest pain/palpitation. No falls. Pt recently started taking flomax + prozac 4/2/19 for urinary urgency/dysuria and depression respectively. No past medical history on file. No past surgical history on file. No family history on file. Social History Socioeconomic History  Marital status:  Spouse name: Not on file  Number of children: Not on file  Years of education: Not on file  Highest education level: Not on file Occupational History  Not on file Social Needs  Financial resource strain: Not on file  Food insecurity:  
  Worry: Not on file Inability: Not on file  Transportation needs:  
  Medical: Not on file Non-medical: Not on file Tobacco Use  Smoking status: Current Every Day Smoker  Smokeless tobacco: Never Used Substance and Sexual Activity  Alcohol use: Yes Frequency: Never Comment: \"occasional\"  Drug use: Not on file  Sexual activity: Yes  
  Partners: Female Lifestyle  Physical activity:  
  Days per week: Not on file Minutes per session: Not on file  Stress: Not on file Relationships  Social connections:  
  Talks on phone: Not on file Gets together: Not on file Attends [employer] service: Not on file Active member of club or organization: Not on file Attends meetings of clubs or organizations: Not on file Relationship status: Not on file  Intimate partner violence:  
  Fear of current or ex partner: Not on file Emotionally abused: Not on file Physically abused: Not on file Forced sexual activity: Not on file Other Topics Concern  Not on file Social History Narrative  Not on file ALLERGIES: Azithromycin Review of Systems Constitutional: Negative for chills and fever. Respiratory: Negative for chest tightness and shortness of breath. Cardiovascular: Negative for chest pain and leg swelling. Gastrointestinal: Positive for abdominal distention, abdominal pain and constipation. Negative for blood in stool, nausea, rectal pain and vomiting. Genitourinary: Positive for dysuria. Negative for difficulty urinating and hematuria. Musculoskeletal: Positive for back pain. Neurological: Negative for weakness. Psychiatric/Behavioral: Negative for agitation and confusion. Vitals:  
 04/08/19 1527 BP: 119/84 Pulse: (!) 126 Resp: 18 Temp: 98.8 °F (37.1 °C) SpO2: 97% Weight: 89.8 kg (198 lb) Height: 5' 4\" (1.626 m) Physical Exam  
Constitutional: He is oriented to person, place, and time. HENT:  
Head: Normocephalic and atraumatic. Cardiovascular: Normal rate, regular rhythm and normal heart sounds.   
Pulmonary/Chest: Effort normal and breath sounds normal.  
 Abdominal: Soft. Bowel sounds are normal. There is generalized tenderness. There is no rebound and no guarding. Musculoskeletal: Normal range of motion. He exhibits no tenderness. Neurological: He is alert and oriented to person, place, and time. He displays normal reflexes. Strength 5/5 in b/l UE + LE Skin: Skin is warm and dry. MDM Number of Diagnoses or Management Options Diagnosis management comments: Acute on chronic back pain - XR L spine - IV Toradol 
- PO Valium for muscle spasm Abdominal pain + dysuria 
- CBC, CMP, lipase - UA 
- IVF Hx CP 
- Trop - CXR Patient Progress Patient progress: stable CXR + XR L Spine - no acute process Trop neg 
CMP, lipase wnl CBC - WBC 14.2 UA neg Pt reports \"30% improvement\" in sx, agreeable to d/c w/ follow-up at [facility] w/ PCP tomorrow Pt seen and discussed w/ Dr. Corrina Miner, ED Attending Eyal Ward MD 
Family Medicine Resident

## 2019-04-08 NOTE — TELEPHONE ENCOUNTER
----- Message from Maylin Pedroza sent at 4/8/2019 10:30 AM EDT -----  Regarding: Dr. Ezio Rueda Telephone  Mrs. Kelly Dean, pt wife requesting a call back in [facility] to the pt appointment today. Mrs. Yuan best contact number is [phone].

## 2019-04-08 NOTE — DISCHARGE INSTRUCTIONS
Patient Education        Learning About Relief for Back Pain  What is back tension and strain? Back strain happens when you overstretch, or pull, a muscle in your back. You may hurt your back in an accident or when you exercise or lift something. Most back pain will get better with rest and time. You can take care of yourself at home to help your back heal.  What can you do first to relieve back pain? When you first feel back pain, try these steps:  · Walk. Take a short walk (10 to 20 minutes) on a level surface (no slopes, hills, or stairs) every 2 to 3 hours. Walk only distances you can manage without pain, especially leg pain. · Relax. Find a comfortable position for rest. Some people are comfortable on the floor or a medium-firm bed with a small pillow under their head and another under their knees. Some people prefer to lie on their side with a pillow between their knees. Don't stay in one position for too long. · Try heat or ice. Try using a heating pad on a low or medium setting, or take a warm shower, for 15 to 20 minutes every 2 to 3 hours. Or you can buy single-use heat wraps that last up to 8 hours. You can also try an ice pack for 10 to 15 minutes every 2 to 3 hours. You can use an ice pack or a bag of frozen vegetables wrapped in a thin towel. There is not strong evidence that either heat or ice will help, but you can try them to see if they help. You may also want to try switching between heat and cold. · Take pain medicine exactly as directed. ¨ If the doctor gave you a prescription medicine for pain, take it as prescribed. ¨ If you are not taking a prescription pain medicine, ask your doctor if you can take an over-the-counter medicine. What else can you do? · Stretch and exercise. Exercises that increase flexibility may relieve your pain and make it easier for your muscles to keep your spine in a good, neutral position. And don't forget to keep walking. · Do self-massage.  You can use self-massage to unwind after work or school or to energize yourself in the morning. You can easily massage your feet, hands, or neck. Self-massage works best if you are in comfortable clothes and are sitting or lying in a comfortable position. Use oil or lotion to massage bare skin. · Reduce stress. Back pain can lead to a vicious [place]: Distress about the pain tenses the muscles in your back, which in turn causes more pain. Learn how to relax your mind and your muscles to lower your stress. Where can you learn more? Go to http://tracy-tana.info/. Enter Y414 in the search box to learn more about \"Learning About Relief for Back Pain. \"  Current as of: March 21, 2017  Content Version: 11.5  © 8119-6852 Healthwise, Incorporated. Care instructions adapted under license by [employer] (which disclaims liability or warranty for this information). If you have questions about a medical condition or this instruction, always ask your healthcare professional. [address] any warranty or liability for your use of this information.

## 2019-04-19 ENCOUNTER — OFFICE VISIT (OUTPATIENT)
Dept: FAMILY MEDICINE CLINIC | Age: 59
End: 2019-04-19

## 2019-04-19 VITALS
WEIGHT: 192 LBS | BODY MASS INDEX: 32.78 KG/M2 | HEART RATE: 108 BPM | HEIGHT: 64 IN | SYSTOLIC BLOOD PRESSURE: 126 MMHG | DIASTOLIC BLOOD PRESSURE: 92 MMHG | TEMPERATURE: 98.3 F | OXYGEN SATURATION: 96 % | RESPIRATION RATE: 16 BRPM

## 2019-04-19 DIAGNOSIS — F32.4 MAJOR DEPRESSIVE DISORDER WITH SINGLE EPISODE, IN PARTIAL REMISSION (HCC): ICD-10-CM

## 2019-04-19 DIAGNOSIS — I10 ESSENTIAL HYPERTENSION: Primary | ICD-10-CM

## 2019-04-19 DIAGNOSIS — M54.41 CHRONIC BILATERAL LOW BACK PAIN WITH BILATERAL SCIATICA: ICD-10-CM

## 2019-04-19 DIAGNOSIS — M54.42 CHRONIC BILATERAL LOW BACK PAIN WITH BILATERAL SCIATICA: ICD-10-CM

## 2019-04-19 DIAGNOSIS — G89.29 CHRONIC BILATERAL LOW BACK PAIN WITH BILATERAL SCIATICA: ICD-10-CM

## 2019-04-19 DIAGNOSIS — N40.1 BENIGN PROSTATIC HYPERPLASIA WITH URINARY HESITANCY: ICD-10-CM

## 2019-04-19 DIAGNOSIS — Z12.11 SCREENING FOR COLON CANCER: ICD-10-CM

## 2019-04-19 DIAGNOSIS — R14.0 ABDOMINAL BLOATING: ICD-10-CM

## 2019-04-19 DIAGNOSIS — R39.11 BENIGN PROSTATIC HYPERPLASIA WITH URINARY HESITANCY: ICD-10-CM

## 2019-04-19 RX ORDER — AMLODIPINE BESYLATE 10 MG/1
10 TABLET ORAL DAILY
Qty: 30 TAB | Refills: 1 | Status: SHIPPED | OUTPATIENT
Start: 2019-04-19 | End: 2019-06-28 | Stop reason: SDUPTHER

## 2019-04-19 NOTE — PATIENT INSTRUCTIONS
Learning About Relief for Back Pain  What is back tension and strain? Back strain happens when you overstretch, or pull, a muscle in your back. You may hurt your back in an accident or when you exercise or lift something. Most back pain will get better with rest and time. You can take care of yourself at home to help your back heal.  What can you do first to relieve back pain? When you first feel back pain, try these steps:  · Walk. Take a short walk (10 to 20 minutes) on a level surface (no slopes, hills, or stairs) every 2 to 3 hours. Walk only distances you can manage without pain, especially leg pain. · Relax. Find a comfortable position for rest. Some people are comfortable on the floor or a medium-firm bed with a small pillow under their head and another under their knees. Some people prefer to lie on their side with a pillow between their knees. Don't stay in one position for too long. · Try heat or ice. Try using a heating pad on a low or medium setting, or take a warm shower, for 15 to 20 minutes every 2 to 3 hours. Or you can buy single-use heat wraps that last up to 8 hours. You can also try an ice pack for 10 to 15 minutes every 2 to 3 hours. You can use an ice pack or a bag of frozen vegetables wrapped in a thin towel. There is not strong evidence that either heat or ice will help, but you can try them to see if they help. You may also want to try switching between heat and cold. · Take pain medicine exactly as directed. ? If the doctor gave you a prescription medicine for pain, take it as prescribed. ? If you are not taking a prescription pain medicine, ask your doctor if you can take an over-the-counter medicine. What else can you do? · Stretch and exercise. Exercises that increase flexibility may relieve your pain and make it easier for your muscles to keep your spine in a good, neutral position. And don't forget to keep walking. · Do self-massage.  You can use self-massage to unwind after work or school or to energize yourself in the morning. You can easily massage your feet, hands, or neck. Self-massage works best if you are in comfortable clothes and are sitting or lying in a comfortable position. Use oil or lotion to massage bare skin. · Reduce stress. Back pain can lead to a vicious [place]: Distress about the pain tenses the muscles in your back, which in turn causes more pain. Learn how to relax your mind and your muscles to lower your stress. Where can you learn more? Go to http://tracy-tana.info/. Enter H129 in the search box to learn more about \"Learning About Relief for Back Pain. \"  Current as of: September 20, 2018  Content Version: 11.9  © 1159-2348 [employer]. Care instructions adapted under license by [employer] (which disclaims liability or warranty for this information). If you have questions about a medical condition or this instruction, always ask your healthcare professional. [place] [address] any warranty or liability for your use of this information.

## 2019-04-19 NOTE — PROGRESS NOTES
[address] [place] [facility]    Office [phone], Fax [phone]    Subjective:     Chief Complaint   Patient presents with    Back Pain    Hypertension     History provided by patient     HPI:  Patsy Henderson is a 62 y.o.  male presents for ER follow up. Significant history pertinent to presentation includes chronic back pain, BPH, HTN, depression. ER follow up for pre-syncopal episode and back pain  - 4/12 pt had gone to the store and had a bowel movement and at the store he felt lightheaded while in the bathroom. Monday before his appt with Dr Rayo Herrera, he felt lightheaded in the bathroom as well. Denies cp, palpitation. Normal BM (twice a day, no blood). Acute on chronic back pain  - Seen at ED with normal XR L spine. He was given IV Toradol and PO Valium for muscle spasm. Per ER doc, was recommended to discuss benefits of MRI. Pt described that the home exercises are actually helping him and that he may not want steroid injections anymore as it did not help him on the second try. No red flags.      Chronic abdominal pain/bloating.  - Has had abd bloating more couple years. No acute change. Normal BM. In ED, he had CBC (wbc 14.2), with normal CMP, lipase, UA, Trop, and CXR.     BPH. - Flomax, started at last visit, has helped greatly with stream.     Depression  - Mood improved on prozac. Wife sees big difference. Says has been only about 3 weeks. Tried cymbalta before and had worsened his BPH symptoms, would not want to try it even with his back pain and sciatica symptoms. HTN. BP running 150s/90s at home. Hx of stroke with intolerance to statin  - Has tried Simvastain, lipitor, crestor and could not tolerate them as he got muscle aches with each. Has done well on fluvastatin but said it was 300 dollors.      Medication reviewed. Allergy reviewed. SocHx. - current smoker, social alcohol use, no illcit drug use.    - Occupation: currently not employed     ROS (bolded are positive):   General Negative for fever, chills, changes in weight, changes in appetite   HEENT Negative for hearing loss, earache, congestion, sore throat   CV Negative for chest pain, palpitations, edema   Respiratory Negative for cough, shortness of breath, wheezing   GI Negative for change in bowel habits, abdominal pain, black or bloody stools, nausea or vomiting    Negative for frequency, dysuria, hematuria, vaginal discharge   MSK Negative for back pain, joint pain, muscle pain   Skin Negative for itching, rash, hives   Neuro Negative for dizziness, lightheaded, headache, confusion, weakness   Psych Negative for anxiety, depression, change in mood     Objective:   Vitals - reviewed  Visit Vitals  BP (!) 126/92   Pulse (!) 108 (in pain)   Temp 98.3 °F (36.8 °C) (Oral)   Resp 16   Ht 5' 4\" (1.626 m)   Wt 192 lb (87.1 kg)   SpO2 96%   BMI 32.96 kg/m²        Physical exam:   GEN: NAD. Alert. Well nourished. EYES:  Conjunctiva clear; PERRLA. extraocular movements are intact. EAR: External ears are normal. Tympanic membranes are clear and without effusion. NOSE: Turbinates are within normal limits. No drainage  OROPHYARYNX: No oral lesions or exudates. NECK:  Supple; no masses; thyroid normal           LUNGS: Respirations unlabored; CTAB. no wheeze, rales, rhonchi   CARDIOVASCULAR: Regular, rate, and rhythm without murmurs, gallops or rubs   ABDOMEN: Soft; nonspecific generalized mild tenderness, ND. +bowel sounds; no rebound tenderness, no guarding. no masses or organomegaly  NEUROLOGIC: No focal neurologic deficits. Strength and sensation intact. Neg straight leg test.   MSK: FROM but limited by pain. Good tone. B/l ower back paraspinal tenderness. EXT: Well perfused. No edema. No erythema. PSYCH: appropriate mood and affect. Good insight and judgement. Cooperative. SKIN: No obvious rashes or lesions.      Pertinent Labs/Studies:   - CBC (wbc 14.2), normal CMP, lipase, UA, Trop, and CXR/spine xray. Assessment and orders:       ICD-10-CM ICD-9-CM    1. Essential hypertension I10 401.9 amLODIPine (NORVASC) 10 mg tablet   2. Screening for colon cancer Z12.11 V76.51 REFERRAL TO GASTROENTEROLOGY   3. Abdominal bloating R14.0 787.3    4. Chronic bilateral low back pain with bilateral sciatica M54.42 724.2     M54.41 724.3     G89.29 338.29    5. Benign prostatic hyperplasia with urinary hesitancy N40.1 600.01     R39.11 788.64    6. Major depressive disorder with single episode, in partial remission ([address] F32.4 296.25      Diagnoses and all orders for this visit:    1. Essential hypertension. BP uncontrolled. Had syncopal episodes with bathroom use (vasovagal). Increased amlodpine but given precautions to watch BP and to go back to 5mg if too much. -     amLODIPine (NORVASC) 10 mg tablet; Take 1 Tab by mouth daily. 2. Screening for colon cancer. Not sure if he had screening colonoscopy. Referral made. -     REFERRAL TO GASTROENTEROLOGY    3. Abdominal bloating. Chronic, for past 3-4 years. Discussed high fiber diet and discussion with GI doctor when going for colonoscopy. 4. Chronic bilateral low back pain with bilateral sciatica. Says mildly improved with home exercise. Does not feel he would benefit from another steroid injection. If not improving, could benefit from MRI. Deferred discussion with Dr Meli Garcia for decision. To follow up. 5. Benign prostatic hyperplasia with urinary hesitancy. Improved with flomax. Continue with current dose. 6. Major depressive disorder with single episode, in partial remission ([address] Per wife improved, still only been 3 weeks, to re-assess on follow up. Hx of worsened BPH with Cymbalta and deferred from prescribing. Follow-up and Dispositions    · Return in about 3 weeks (around 5/10/2019) for HTN and depression. Pt was discussed with Dr Mecca Pedersen (attending physician). I have reviewed patient medical and social history and medications. I have reviewed pertinent labs results and other data. I have discussed the diagnosis with the patient and the intended plan as seen in the above orders. The patient has received an after-visit summary and questions were answered concerning future plans. I have discussed medication side effects and warnings with the patient as well.     Myron Mortimer, MD  Resident [facility]  04/22/19

## 2019-04-19 NOTE — PROGRESS NOTES
Chief Complaint   Patient presents with    Back Pain    Hypertension     1. Have you been to the ER, urgent care clinic since your last visit? Hospitalized since your last visit? Yes. BS    2. Have you seen or consulted any other health care providers outside of the [address] [place] [address] since your last visit? Include any pap smears or colon screening.  No

## 2019-05-06 ENCOUNTER — OFFICE VISIT (OUTPATIENT)
Dept: FAMILY MEDICINE CLINIC | Age: 59
End: 2019-05-06

## 2019-05-06 VITALS
HEART RATE: 98 BPM | WEIGHT: 194 LBS | DIASTOLIC BLOOD PRESSURE: 87 MMHG | OXYGEN SATURATION: 96 % | BODY MASS INDEX: 33.12 KG/M2 | SYSTOLIC BLOOD PRESSURE: 136 MMHG | RESPIRATION RATE: 20 BRPM | HEIGHT: 64 IN | TEMPERATURE: 97.8 F

## 2019-05-06 DIAGNOSIS — E78.2 MIXED HYPERLIPIDEMIA: ICD-10-CM

## 2019-05-06 DIAGNOSIS — F32.A DEPRESSION, UNSPECIFIED DEPRESSION TYPE: ICD-10-CM

## 2019-05-06 DIAGNOSIS — N40.1 BENIGN PROSTATIC HYPERPLASIA WITH URINARY HESITANCY: ICD-10-CM

## 2019-05-06 DIAGNOSIS — R39.11 BENIGN PROSTATIC HYPERPLASIA WITH URINARY HESITANCY: ICD-10-CM

## 2019-05-06 DIAGNOSIS — M53.3 SACROILIAC JOINT DYSFUNCTION OF BOTH SIDES: Primary | ICD-10-CM

## 2019-05-06 RX ORDER — TAMSULOSIN HYDROCHLORIDE 0.4 MG/1
0.4 CAPSULE ORAL DAILY
Qty: 30 CAP | Refills: 0 | Status: SHIPPED | OUTPATIENT
Start: 2019-05-06 | End: 2019-06-06 | Stop reason: SDUPTHER

## 2019-05-06 RX ORDER — FLUOXETINE 10 MG/1
20 CAPSULE ORAL DAILY
Qty: 30 CAP | Refills: 0 | Status: SHIPPED | OUTPATIENT
Start: 2019-05-06 | End: 2019-06-28 | Stop reason: DRUGHIGH

## 2019-05-06 RX ORDER — FLUVASTATIN 20 MG/1
20 CAPSULE ORAL
Qty: 30 CAP | Refills: 0 | Status: SHIPPED | OUTPATIENT
Start: 2019-05-06

## 2019-05-06 NOTE — PROGRESS NOTES
Félix Schofield [address] Petra Hsu  Office [phone], Fax [phone]:  
Micheline Monroy is a 61 y.o. male CC: Back pain History provided by patient HPI: 
Back pain 
- Onset: 1yr ago worsened 
-Numbness and tingling, radiating down from low back, yr 
-physical therapy: little help - Steroid injection: 1st time 1/17/19: 3-4wks of relieve. 2nd time in 3/4/19 
Marthena Brochure helps 3-4 pills at night 
- Sitting 6/10.  
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 - No trauma or injuries 
- no weight loss, fever sweats, no chest pain, sob, no changes in urinary or bowel habits 
- Has been having more constipation, eat a lot of meat/veggies, tries to increase fiber 
- never had a colonoscopy- will call tomorrow Depression - Prozac seems to be helping but thinks that there is room for important. No current thoughts of hurting self or others. BPH 
- Flomax helping, urinating better - Takes off the fluid 
-States he ran out of medication and needs a refill. Mixed HLD 
- Taking statin. States he ran out of medication and needs a refill. No past medical history on file. Allergies Allergen Reactions  Azithromycin Hives and Itching  Erythromycin Hives and Itching Current Outpatient Medications on File Prior to Visit Medication Sig Dispense Refill  amLODIPine (NORVASC) 10 mg tablet Take 1 Tab by mouth daily. 30 Tab 1  
 naproxen sodium (ALEVE) 220 mg cap Take  by mouth.  aspirin delayed-release 81 mg tablet Take  by mouth daily.  FLUoxetine (PROZAC) 10 mg capsule Take 1 Cap by mouth daily. 30 Cap 1  
 tamsulosin (FLOMAX) 0.4 mg capsule Take 1 Cap by mouth daily. 30 Cap 0  
 lisinopril (PRINIVIL, ZESTRIL) 10 mg tablet Take 1 Tab by mouth daily.  30 Tab 1  
 naproxen (NAPROSYN) 500 mg tablet TAKE ONE TABLET BY MOUTH TWICE A DAY AS NEEDED WITH MEALS 30 Tab 0  
  fluvastatin (LESCOL) 20 mg capsule Take 1 Cap by mouth nightly. 30 Cap 2 No current facility-administered medications on file prior to visit. No family history on file. Social History Socioeconomic History  Marital status:  Spouse name: Not on file  Number of children: Not on file  Years of education: Not on file  Highest education level: Not on file Tobacco Use  Smoking status: Current Every Day Smoker Packs/day: 1.00 Years: 49.00 Pack years: 49.00 Types: Cigarettes  Smokeless tobacco: Never Used Substance and Sexual Activity  Alcohol use: Yes Frequency: Never Comment: \"occasional\"  Sexual activity: Yes  
  Partners: Female No past surgical history on file. Patient Active Problem List  
Diagnosis Code  Coronary artery disease involving native coronary artery of native heart without angina pectoris I25.10  Hypertrophy of prostate without urinary obstruction and other lower urinary tract symptoms (LUTS) N40.0  Tobacco use disorder F17.200 ROS Objective:  
 
Visit Vitals /87 (BP 1 Location: Left arm, BP Patient Position: Sitting) Pulse 98 Temp 97.8 °F (36.6 °C) (Oral) Resp 20 Ht 5' 4\" (1.626 m) Wt 194 lb (88 kg) SpO2 96% BMI 33.30 kg/m² Physical Exam  
Constitutional: He appears well-developed and well-nourished. No distress. Eyes: Right eye exhibits no discharge. Left eye exhibits no discharge. Cardiovascular: Normal rate, regular rhythm and normal heart sounds. Pulmonary/Chest: Effort normal and breath sounds normal. No respiratory distress. He has no wheezes. Abdominal: Soft. There is no tenderness. Musculoskeletal:  
     Lumbar back: He exhibits no bony tenderness, no swelling, no edema, no deformity and no laceration. Lymphadenopathy:  
  He has no cervical adenopathy. Neurological: He is alert. Skin: Skin is warm. He is not diaphoretic. Psychiatric: He has a normal mood and affect. General: Alert and oriented and in no acute distress. Responds to all questions appropriately LUNGS: Respirations unlabored Skin: No obvious rash MSK:  
 Posture: Normal 
 Deformity: None ROM:  
  Lumbar Flexion: Normal 
  Lumbar Extension: Normal 
  Lateral bending: Normal  
  Hip Flexion: Normal 
 
 Gait: Normal   
 
 Palpation: L1-L5: No Tenderness Sacrum: No Tenderness Coccyx: No Tenderness Paraspinal:   Left: No Tenderness Right: No Tenderness Sacroiliac Joint:  Left: No Tenderness Right: Tenderness Piriformis Muscle:  Left: No Tenderness Right: No Tenderness Greater Trochanter:   Left: No Tenderness Right: No Tenderness Ischial Tuberosity:  Left: No Tenderness Right: No Tenderness Strength (0-5/5) Hip Flexion:   Left: 5/5  Right: 5/5 Hip Extension:   Left: 5/5  Right: 5/5 Hip Abduction:  Left: 5/5  Right: 5/5 Hip Adduction:   Left: 5/5  Right: 5/5 Knee Extension:  Left: 5/5  Right: 5/5 Knee Flexion:   Left: 5/5  Right: 5/5 Ankle dorsiflexion:  Left: 5/5  Right: 5/5 Ankle plantarflexion:  Left: 5/5  Right: 5/5 Great toe extension:  Left: 5/5  Right: 5/5 Sensation: L4-S1 intact, no deficits noted DTR: 
  Patella:  Left: +2  Right: +2 Achilles:  Left: +2  Right: +2 Special test: 
  Trendelenburg: Left: Negative  Right: Negative Straight leg:  Left: Negative  Right: Negative JOSE:  Left: Negative  Right: Positive FADIR:  Left: Negative  Right: Negative Piriformis:  Left: Negative  Right: Negative Assessment and orders:  
 
 
1. Sacroiliac joint dysfunction of both sides No improvement on his exam with home exercises. His popliteal angle continues to be over 80 degrees bilaterally. He has not done formal physical therapy which I have asked him to do for the last 2 visits. We will refer him to physical therapy again.   I do not believe that injection will help him. Follow-up in 8 weeks. 2. Depression, unspecified depression type Medication refill. Follow-up with his regular family physician. 
- FLUoxetine (PROZAC) 10 mg capsule; Take 1 Cap by mouth daily. Dispense: 30 Cap; Refill: 1 3. Benign prostatic hyperplasia with urinary hesitancy Stable. Follow-up with her normal family physician. 
- tamsulosin (FLOMAX) 0.4 mg capsule; Take 1 Cap by mouth daily. Dispense: 30 Cap; Refill: 0 
 
4. Mixed hyperlipidemia Stable. Follow-up with a normal family physician. - fluvastatin (LESCOL) 20 mg capsule; Take 1 Cap by mouth nightly. Dispense: 30 Cap; Refill: 2 Patient encounter was at least 25 minutes with more than 50 percent of the visit involved in counseling Follow-up and Dispositions · Return in about 8 weeks (around 7/1/2019).

## 2019-05-06 NOTE — PROGRESS NOTES
Identified Patient with two Patient identifiers (Name and ). Two Patient Identifiers confirmed. Reviewed record in preparation for visit and have obtained necessary documentation. Chief Complaint Patient presents with  Back Pain  
  follow up - a little better but still in alot of pain.  Medication Refill Lescol, Prozac, Flomax Visit Vitals /87 (BP 1 Location: Left arm, BP Patient Position: Sitting) Pulse 98 Temp 97.8 °F (36.6 °C) (Oral) Resp 20 Ht 5' 4\" (1.626 m) Wt 194 lb (88 kg) SpO2 96% BMI 33.30 kg/m² 1. Have you been to the ER, urgent care clinic since your last visit? Hospitalized since your last visit? No 
 
2. Have you seen or consulted any other health care providers outside of the [address] [place] [address] since your last visit? Include any pap smears or colon screening.  No

## 2019-05-22 ENCOUNTER — HOSPITAL ENCOUNTER (OUTPATIENT)
Dept: PHYSICAL THERAPY | Age: 59
Discharge: HOME | End: 2019-05-22
Payer: SUBSIDIZED

## 2019-05-22 PROCEDURE — 97530 THERAPEUTIC ACTIVITIES: CPT | Performed by: PHYSICAL MEDICINE & REHABILITATION

## 2019-05-22 PROCEDURE — 97162 PT EVAL MOD COMPLEX 30 MIN: CPT | Performed by: PHYSICAL MEDICINE & REHABILITATION

## 2019-05-22 PROCEDURE — 97110 THERAPEUTIC EXERCISES: CPT | Performed by: PHYSICAL MEDICINE & REHABILITATION

## 2019-05-22 NOTE — PROGRESS NOTES
PT INITIAL EVALUATION NOTE 2-15    Patient Name: Micheline Monroy  Date:2019  : 1960  [x]  Patient  Verified  Payor: ADÁN / Plan: [facility] % / Product Type: Zari Vigil /    In time: 1200  Out time: 0100  Total Treatment Time (min): 60  Visit #: 1     Treatment Area: Sacrococcygeal disorders, not elsewhere classified [M53.3]  Benign prostatic hyperplasia with lower urinary tract symptoms [N40.1]    SUBJECTIVE  Pain Level (0-10 scale): 8  Any medication changes, allergies to medications, adverse drug reactions, diagnosis change, or new procedure performed?: [] No    [x] Yes (see summary sheet for update)  Subjective:     Patient is a 61year old male with complaints of low back pain with radiating pain into both legs into his feet. He will have toe cramping at times. He has had this problem for over 12 years, had significant exacerbation less than a year ago, briefly participated in PT. He does use a TENS unit at home occasionally. He lives in [place], drives approx 2 hours to this clinic. He states that he does not have carotid blood flow on the L side since his last stroke. He states that he has had a doppler and 'it is dead. \". He reports some short term memory loss since his CVA. Chart Review:    :  Seen in ER for syncope related to valsalva in the bathroom. 10/18  CT Scan findings. The lumbar spine alignment is normal. The vertebral bodies are intact. There is no evidence of fracture. There is multilevel degenerative disc disease. Central osteophytes are seen throughout the lumbar spine. There is probable mild  spinal stenosis at T12-L1, L1-2, L2-3, and L3-4. Facet arthropathy is present  throughout the lumbar spine, most significant at L5-S1 on the right.        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 Other Observations: Sits with forward flexion leaned to R, uses arms to unweight spine in sitting   Gait and Functional Mobility: 2 UE to transition sit to stand.   Gait, wide LISSETT, decreased arm swing and trunk rotation     Palpation: hypertonicity through thoracic, lumbar paraspinals  Height:  5' 4\"  Weight: 200lbs            Lumbar AROM:        R  L  Flexion    Fingertips to knee joint line, stiff through lumbar           Extension   Unable to move beyond neutral           Side Bending   25% with LBP   50% with LBP           Rotation   25%  25%            Aberrant movements:  Painful arc: [x] Yes  [] No  Instability catch: [] Yes  [] No  Difficulty returning from flexion: [x] Yes  [] No  Reversal of lumbopelvic rhythm: [x] Yes  [] No      LOWER QUARTER   MUSCLE STRENGTH  KEY       R  L  0 - No Contraction  L1, L2 Psoas  4/5   4/5     1 - Trace   L3 Quads  4/5  5/5     2 - Poor   L4 Tib Ant  5/5  5/5     3 - Fair    L5 EHL  4/5  5/5      4 - Good   S1 FHL  5/5  5/5      5 - Normal   S2 Hams  5/5  5/5             MMT:  Core strength:  -4/5   Hip abduction: 4/5   Hip extension: 4/5    Neurological: Sensation: B LE parasthesias/tingling globally, denies numbness     Special Tests: Single LE stance R 6 sec, L 11 sec              TUG: abnormal for fall risk secondary to time and appearance of LOB during gait and during turn               Trendelenberg: + Bilaterally                                                                  Forward Taylor: + for back and ant hip pain                                                      Slump: + bilaterally at full knee ext   Limited B hip flexor and hamstring extensibility Bilaterally                                 Modality rationale: decrease pain and increase tissue extensibility to improve the patients ability to walk with decreased pain    Min Type Additional Details   15 [x] Estim: []Att   []Unatt        []TENS instruct                  [x]IFC  []Premod   []NMES                     []Other:  []w/US   []w/ice   [x]w/heat  Position: sitting  Location: lumbar    []  Traction: [] Cervical       []Lumbar                       [] Prone          []Supine                       []Intermittent   []Continuous Lbs:  [] before manual  [] after manual  []w/heat    []  Ultrasound: []Continuous   [] Pulsed at:                            []1MHz   []3MHz Location:  W/cm2:    []  Paraffin         Location:  []w/heat    []  Ice     []  Heat  []  Ice massage Position:  Location:    []  Laser  []  Other: Position:  Location:    []  Vasopneumatic Device Pressure:       [] lo [] med [] hi   Temperature:    [x] Skin assessment post-treatment:  [x]intact []redness- no adverse reaction    []redness  adverse reaction:     15 min Therapeutic Exercise:  [x] See flow sheet :   Rationale: increase ROM and increase strength to improve the patients ability to stand to cook without pain     15 min Therapeutic Activity:  []  See flow sheet :  Posture correction, lumbar protection techniques with ADL's including transfers, bed mobility, sitting posture, standing posture. Rationale: increase proprioception and improve posture  to improve the patients ability to transition with no pain.           With   [x] TE   [x] TA   [] neuro   [] other: Patient Education: [x] Review HEP    [] Progressed/Changed HEP based on:   [] positioning   [] body mechanics   [] transfers   [] heat/ice application    [x] other:  See HEP in chart       Other Objective/Functional Measures:  Pt c/o dizziness moving supine to sit, fully recovered within 2 minutes. Pain Level (0-10 scale) post treatment:   5      ASSESSMENT:   Pt with history of chronic LBP with radicular symptoms presents with increased fall risk, decreased trunk ROM, decreased trunk strength, poor postural habits, obesity, comorbidities that are contributory. He will benefit from skilled PT services to address his limitations and achieve his functional goals as stated on the plan of care. Pt also has diagnosis of benign prostatic hyperplasia with urinary hesitancy, this diagnosis was not addressed by me today due to inappropriate sexual comments from the patient. Moving forward he will be scheduled with male therapists.        [x]  See Plan of Srinivas Blanchard PT, MSPT 5/22/2019

## 2019-05-22 NOTE — PROGRESS NOTES
[address] [facility] Sarath Gonzalez 57  Phone: [phone]  Fax: [phone]    Plan of Care/ Statement of Necessity for Physical Therapy Services 2-15    Patient name: Jodie Mohs  : 1960  Provider#: [id number]  Referral source: Referred, Self, MD      Medical/Treatment Diagnosis: Sacrococcygeal disorders, not elsewhere classified [M53.3]  Benign prostatic hyperplasia with lower urinary tract symptoms [N40.1]     Prior Hospitalization: see medical history     Comorbidities: multiple MI, CVA, HTN, Cardiac stents, arhythmia, stenosis, depression, obesity   Prior Level of Function: working > 1 year ago  Medications: Verified on Patient Summary List    Start of Care: 19      Onset Date: Chronic, exacerbation within 1 yr      The Plan of Care and following information is based on the information from the initial evaluation. Assessment/ key information: Pt with history of chronic LBP with radicular symptoms presents with increased fall risk, decreased trunk ROM, decreased trunk strength, poor postural habits, obesity, comorbidities that are contributory. He will benefit from skilled PT services to address his limitations and achieve his functional goals as stated on the plan of care. Pt also has diagnosis of benign prostatic hyperplasia with urinary hesitancy, this diagnosis was not addressed by me today due to inappropriate sexual comments from the patient. Due to this behavior pelvic floor therapy is contraindicated at this time. Evaluation Complexity History MEDIUM  Complexity : 1-2 comorbidities / personal factors will impact the outcome/ POC ; Examination MEDIUM Complexity : 3 Standardized tests and measures addressing body structure, function, activity limitation and / or participation in recreation  ;Presentation MEDIUM Complexity : Evolving with changing characteristics  ; Clinical Decision Making MEDIUM Complexity : FOTO score of 26-74  Overall Complexity Rating: MEDIUM    Problem List: pain affecting function, decrease ROM, decrease strength, impaired gait/ balance, decrease ADL/ functional abilitiies, decrease activity tolerance, decrease flexibility/ joint mobility and decrease transfer abilities   Treatment Plan may include any combination of the following: Therapeutic exercise, Therapeutic activities, Neuromuscular re-education, Physical agent/modality, Gait/balance training, Manual therapy, Patient education, Self Care training and Functional mobility training  Patient / Family readiness to learn indicated by: asking questions  Persons(s) to be included in education: patient (P)  Barriers to Learning/Limitations: yes;  cognitive and other behavioral   Patient Goal (s): walk with no pain  Patient Self Reported Health Status: fair  Rehabilitation Potential: fair  (travel distance, pt non compliant previous PT admission)    Short Term Goals: To be accomplished in 4 weeks:  Pt will be independent with a progressive HEP  Pt will demonstrate good posture with sitting, standing, transitional ADLs  Pt will have no pain with standing 10 minutes  Pt will tolerate walking 5 minutes with no increased pain    Long Term Goals: To be accomplished in 8 weeks:  Pt will demonstrate 25% increase in functional trunk ROM  Pt will increase trunk strength by 1 ms grade. Pt will tolerate walking 10 minutes for exercise    Frequency / Duration: Patient to be seen 1-2  times per week for 4-8  weeks. Patient/ Caregiver education and instruction: self care, activity modification and exercises    [x]  Plan of care has been reviewed with ZOFIA Escalera PT,  MSPT  5/22/2019     ________________________________________________________________________    I certify that the above Therapy Services are being furnished while the patient is under my care. I agree with the treatment plan and certify that this therapy is necessary.     [phone] Signature:____________________  Date:____________Time: _________

## 2019-05-29 ENCOUNTER — HOSPITAL ENCOUNTER (OUTPATIENT)
Dept: PHYSICAL THERAPY | Age: 59
Discharge: HOME | End: 2019-05-29
Payer: SUBSIDIZED

## 2019-05-29 PROCEDURE — 97110 THERAPEUTIC EXERCISES: CPT | Performed by: PHYSICAL MEDICINE & REHABILITATION

## 2019-05-29 PROCEDURE — 97014 ELECTRIC STIMULATION THERAPY: CPT | Performed by: PHYSICAL MEDICINE & REHABILITATION

## 2019-05-29 NOTE — PROGRESS NOTES
PT DAILY TREATMENT NOTE - [facility] 2-15    Patient Name: Shwetha Boyle  Date:2019  : 1960  [x]  Patient  Verified  Payor: Hosey Sacks / Plan: [facility] % / Product Type: Jetty Socrates /    In time:300 pm  Out time:355  Total Treatment Time (min): 55  Total Timed Codes (min): 40  1:1 Treatment Time ( only): -   Visit #: 2      Treatment Area: Sacrococcygeal disorders, not elsewhere classified [M53.3]  Benign prostatic hyperplasia with lower urinary tract symptoms [N40.1]    SUBJECTIVE  Pain Level (0-10 scale): 7-8/10  Any medication changes, allergies to medications, adverse drug reactions, diagnosis change, or new procedure performed?: [x] No    [] Yes (see summary sheet for update)  Subjective functional status/changes:   [] No changes reported  Patient reported he continues to have low back pain and constant radiculopathy that makes it uncomfortable to sleep.      OBJECTIVE    Modality rationale: decrease inflammation, decrease pain and increase tissue extensibility to improve the patients ability to ADLs, sleeping and standing tolerance   Min Type Additional Details   15 [x] Estim: []Att   [x]Unatt        []TENS instruct                  [x]IFC  []Premod   []NMES                     []Other:  []w/US   []w/ice   [x]w/heat  Position: seated  Location: low back    []  Traction: [] Cervical       []Lumbar                       [] Prone          []Supine                       []Intermittent   []Continuous Lbs:  [] before manual  [] after manual  []w/heat    []  Ultrasound: []Continuous   [] Pulsed at:                           []1MHz   []3MHz Location:  W/cm2:    [] Paraffin         Location:   []w/heat    []  Ice     []  Heat  []  Ice massage Position:  Location:    []  Laser  []  Other: Position:  Location:      []  Vasopneumatic Device Pressure:       [] lo [] med [] hi   Temperature:      [x] Skin assessment post-treatment:  [x]intact []redness- no adverse reaction    []redness  adverse reaction:     45 min Therapeutic Exercise:  [x] See flow sheet :   Rationale: increase ROM, increase strength and improve coordination to improve the patients ability to ADLs, standing and walking tolerance            With   [x] TE   [] TA   [] neuro   [] other: Patient Education: [x] Review HEP    [] Progressed/Changed HEP based on:   [] positioning   [] body mechanics   [] transfers   [] heat/ice application    [] other:      Other Objective/Functional Measures:   Patient with trouble with bed mobility without CGA. Patient has a hard time with transfers with an increase in pain. Pain Level (0-10 scale) post treatment: 7-8/10    ASSESSMENT/Changes in Function:     Patient will continue to benefit from skilled PT services to modify and progress therapeutic interventions, address functional mobility deficits, address ROM deficits, address strength deficits, analyze and address soft tissue restrictions, analyze and cue movement patterns, analyze and modify body mechanics/ergonomics and assess and modify postural abnormalities to attain remaining goals.      []  See Plan of Care  []  See progress note/recertification  []  See Discharge Summary         Progress towards goals / Updated goals:  Patient is progressing slowly towards goals due to high levels of pain    PLAN  [x]  Upgrade activities as tolerated     [x]  Continue plan of care  [x]  Update interventions per flow sheet       []  Discharge due to:_  []  Other:_      Norma Rondon PTA, CPT 5/29/2019

## 2019-06-05 ENCOUNTER — HOSPITAL ENCOUNTER (OUTPATIENT)
Dept: PHYSICAL THERAPY | Age: 59
Discharge: HOME | End: 2019-06-05
Payer: SUBSIDIZED

## 2019-06-05 PROCEDURE — 97110 THERAPEUTIC EXERCISES: CPT | Performed by: PHYSICAL MEDICINE & REHABILITATION

## 2019-06-05 PROCEDURE — 97014 ELECTRIC STIMULATION THERAPY: CPT | Performed by: PHYSICAL MEDICINE & REHABILITATION

## 2019-06-05 NOTE — PROGRESS NOTES
PT DAILY TREATMENT NOTE - [facility] 2-15    Patient Name: Carlyle Batres  Date:2019  : 1960  [x]  Patient  Verified  Payor: Faizan Stanton / Plan: [facility] % / Product Type: Yasmani Janet /    In time:250 pm  Out time:300 pm  Total Treatment Time (min): 70  Total Timed Codes (min): 45  1:1 Treatment Time ( only): -   Visit #: 3      Treatment Area: Sacrococcygeal disorders, not elsewhere classified [M53.3]  Benign prostatic hyperplasia with lower urinary tract symptoms [N40.1]    SUBJECTIVE  Pain Level (0-10 scale): 10/10  Any medication changes, allergies to medications, adverse drug reactions, diagnosis change, or new procedure performed?: [x] No    [] Yes (see summary sheet for update)  Subjective functional status/changes:   [] No changes reported  Patient reported he hasn't had any improvements since last visit. Patient states he is off balance when he stands up and has the constant radiating pains.     OBJECTIVE    Modality rationale: decrease inflammation, decrease pain and increase tissue extensibility to improve the patients ability to ADLs, sleeping and standing tolerance   Min Type Additional Details   15 [x] Estim: []Att   [x]Unatt        []TENS instruct                  [x]IFC  []Premod   []NMES                     []Other:  []w/US   []w/ice   [x]w/heat  Position: seated  Location: low back    []  Traction: [] Cervical       []Lumbar                       [] Prone          []Supine                       []Intermittent   []Continuous Lbs:  [] before manual  [] after manual  []w/heat    []  Ultrasound: []Continuous   [] Pulsed at:                           []1MHz   []3MHz Location:  W/cm2:    [] Paraffin         Location:   []w/heat   10 []  Ice     [x]  Heat  []  Ice massage Position:seated  Location: back    []  Laser  []  Other: Position:  Location:      []  Vasopneumatic Device Pressure:       [] lo [] med [] hi   Temperature:      [x] Skin assessment post-treatment:  [x]intact []redness- no adverse reaction    []redness  adverse reaction:     45 min Therapeutic Exercise:  [x] See flow sheet :   Rationale: increase ROM, increase strength and improve coordination to improve the patients ability to ADLs, standing and walking tolerance            With   [x] TE   [] TA   [] neuro   [] other: Patient Education: [x] Review HEP    [] Progressed/Changed HEP based on:   [] positioning   [] body mechanics   [] transfers   [] heat/ice application    [] other:      Other Objective/Functional Measures:   No increase in pain with seated exercises but also no reduction in pain with today's session. Pain Level (0-10 scale) post treatment: 10/10    ASSESSMENT/Changes in Function:     Patient will continue to benefit from skilled PT services to modify and progress therapeutic interventions, address functional mobility deficits, address ROM deficits, address strength deficits, analyze and address soft tissue restrictions, analyze and cue movement patterns, analyze and modify body mechanics/ergonomics and assess and modify postural abnormalities to attain remaining goals.      []  See Plan of Care  []  See progress note/recertification  []  See Discharge Summary         Progress towards goals / Updated goals:  Patient is showing no progress towards goals due to high levels of pain    PLAN  [x]  Upgrade activities as tolerated     [x]  Continue plan of care  [x]  Update interventions per flow sheet       []  Discharge due to:_  []  Other:_      Cruz Sanchez, PTA, CPT 6/5/2019

## 2019-06-06 DIAGNOSIS — R39.11 BENIGN PROSTATIC HYPERPLASIA WITH URINARY HESITANCY: ICD-10-CM

## 2019-06-06 DIAGNOSIS — N40.1 BENIGN PROSTATIC HYPERPLASIA WITH URINARY HESITANCY: ICD-10-CM

## 2019-06-06 RX ORDER — TAMSULOSIN HYDROCHLORIDE 0.4 MG/1
CAPSULE ORAL
Qty: 30 CAP | Refills: 0 | Status: SHIPPED | OUTPATIENT
Start: 2019-06-06 | End: 2019-06-28 | Stop reason: SDUPTHER

## 2019-06-12 ENCOUNTER — HOSPITAL ENCOUNTER (OUTPATIENT)
Dept: PHYSICAL THERAPY | Age: 59
Discharge: HOME | End: 2019-06-12
Payer: SUBSIDIZED

## 2019-06-12 PROCEDURE — 97110 THERAPEUTIC EXERCISES: CPT | Performed by: PHYSICAL MEDICINE & REHABILITATION
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
## 2019-06-19 ENCOUNTER — HOSPITAL ENCOUNTER (OUTPATIENT)
Dept: PHYSICAL THERAPY | Age: 59
Discharge: HOME | End: 2019-06-19
Payer: SUBSIDIZED

## 2019-06-19 PROCEDURE — 97014 ELECTRIC STIMULATION THERAPY: CPT | Performed by: PHYSICAL MEDICINE & REHABILITATION

## 2019-06-19 PROCEDURE — 97110 THERAPEUTIC EXERCISES: CPT | Performed by: PHYSICAL MEDICINE & REHABILITATION

## 2019-06-19 NOTE — PROGRESS NOTES
PT DAILY TREATMENT NOTE - [facility] 2-15    Patient Name: Neeta Dickerson  Date:2019  : 1960  [x]  Patient  Verified  Payor: Ericka Gallegos / Plan: [facility] % / Product Type: [id number] /    In time:230 pm  Out time:340 pm  Total Treatment Time (min): 70  Total Timed Codes (min): 55  1:1 Treatment Time ( only): -   Visit #: 5      Treatment Area: Sacrococcygeal disorders, not elsewhere classified [M53.3]  Benign prostatic hyperplasia with lower urinary tract symptoms [N40.1]    SUBJECTIVE  Pain Level (0-10 scale): 7-8/10  Any medication changes, allergies to medications, adverse drug reactions, diagnosis change, or new procedure performed?: [x] No    [] Yes (see summary sheet for update)  Subjective functional status/changes:   [] No changes reported  Patient reported he feels the same.     OBJECTIVE    Modality rationale: decrease inflammation, decrease pain and increase tissue extensibility to improve the patients ability to ADLs, sleeping and standing tolerance   Min Type Additional Details   15 [x] Estim: []Att   [x]Unatt        []TENS instruct                  [x]IFC  []Premod   []NMES                     []Other:  []w/US   []w/ice   [x]w/heat  Position: supine   Location: low back       []  Traction: [] Cervical       []Lumbar                       [] Prone          []Supine                       []Intermittent   []Continuous Lbs:  [] before manual  [] after manual  []w/heat    []  Ultrasound: []Continuous   [] Pulsed at:                           []1MHz   []3MHz Location:  W/cm2:    [] Paraffin         Location:   []w/heat    []  Ice     [x]  Heat  []  Ice massage Position:seated  Location: back    []  Laser  []  Other: Position:  Location:      []  Vasopneumatic Device Pressure:       [] lo [] med [] hi   Temperature:      [x] Skin assessment post-treatment:  [x]intact []redness- no adverse reaction    []redness  adverse reaction:     55 min Therapeutic Exercise:  [x] See flow sheet :   Rationale: increase ROM, increase strength and improve coordination to improve the patients ability to ADLs, standing and walking tolerance            With   [x] TE   [] TA   [] neuro   [] other: Patient Education: [x] Review HEP    [] Progressed/Changed HEP based on:   [] positioning   [] body mechanics   [] transfers   [] heat/ice application    [] other:      Other Objective/Functional Measures:   No increase in pain with standing therex but mod to max fatigue. Pain Level (0-10 scale) post treatment: 6/10    ASSESSMENT/Changes in Function:     Patient will continue to benefit from skilled PT services to modify and progress therapeutic interventions, address functional mobility deficits, address ROM deficits, address strength deficits, analyze and address soft tissue restrictions, analyze and cue movement patterns, analyze and modify body mechanics/ergonomics and assess and modify postural abnormalities to attain remaining goals. []  See Plan of Care  []  See progress note/recertification  []  See Discharge Summary         Progress towards goals / Updated goals:  Patient is showing little to no progress and will continue for 2 weeks.  If progress is limited then will refer back to MD.    PLAN  [x]  Upgrade activities as tolerated     [x]  Continue plan of care  [x]  Update interventions per flow sheet       []  Discharge due to:_  []  Other:_      Lanie Rajput PTA, CPT 6/19/2019

## 2019-06-26 ENCOUNTER — APPOINTMENT (OUTPATIENT)
Dept: PHYSICAL THERAPY | Age: 59
End: 2019-06-26
Payer: SUBSIDIZED

## 2019-06-28 ENCOUNTER — TELEPHONE (OUTPATIENT)
Dept: FAMILY MEDICINE CLINIC | Age: 59
End: 2019-06-28

## 2019-06-28 ENCOUNTER — OFFICE VISIT (OUTPATIENT)
Dept: FAMILY MEDICINE CLINIC | Age: 59
End: 2019-06-28

## 2019-06-28 VITALS
OXYGEN SATURATION: 98 % | TEMPERATURE: 96.9 F | SYSTOLIC BLOOD PRESSURE: 130 MMHG | HEART RATE: 98 BPM | BODY MASS INDEX: 33.97 KG/M2 | RESPIRATION RATE: 16 BRPM | DIASTOLIC BLOOD PRESSURE: 77 MMHG | HEIGHT: 64 IN | WEIGHT: 199 LBS

## 2019-06-28 DIAGNOSIS — R39.11 BENIGN PROSTATIC HYPERPLASIA WITH URINARY HESITANCY: ICD-10-CM

## 2019-06-28 DIAGNOSIS — N40.1 BENIGN PROSTATIC HYPERPLASIA WITH URINARY HESITANCY: ICD-10-CM

## 2019-06-28 DIAGNOSIS — M54.42 CHRONIC BILATERAL LOW BACK PAIN WITH BILATERAL SCIATICA: ICD-10-CM

## 2019-06-28 DIAGNOSIS — F33.1 MODERATE EPISODE OF RECURRENT MAJOR DEPRESSIVE DISORDER (HCC): ICD-10-CM

## 2019-06-28 DIAGNOSIS — I10 ESSENTIAL HYPERTENSION: Primary | ICD-10-CM

## 2019-06-28 DIAGNOSIS — G89.29 CHRONIC BILATERAL LOW BACK PAIN WITH BILATERAL SCIATICA: ICD-10-CM

## 2019-06-28 DIAGNOSIS — M48.061 SPINAL STENOSIS OF LUMBAR REGION WITHOUT NEUROGENIC CLAUDICATION: ICD-10-CM

## 2019-06-28 DIAGNOSIS — E66.09 CLASS 1 OBESITY DUE TO EXCESS CALORIES WITHOUT SERIOUS COMORBIDITY WITH BODY MASS INDEX (BMI) OF 34.0 TO 34.9 IN ADULT: ICD-10-CM

## 2019-06-28 DIAGNOSIS — M54.41 CHRONIC BILATERAL LOW BACK PAIN WITH BILATERAL SCIATICA: ICD-10-CM

## 2019-06-28 RX ORDER — DULOXETINE 40 MG/1
30 CAPSULE, DELAYED RELEASE ORAL DAILY
Qty: 30 CAP | Refills: 1 | Status: SHIPPED | OUTPATIENT
Start: 2019-06-28 | End: 2019-06-28 | Stop reason: ALTCHOICE

## 2019-06-28 RX ORDER — AMLODIPINE BESYLATE 10 MG/1
10 TABLET ORAL DAILY
Qty: 90 TAB | Refills: 3 | Status: SHIPPED | OUTPATIENT
Start: 2019-06-28

## 2019-06-28 RX ORDER — LISINOPRIL 10 MG/1
TABLET ORAL
Qty: 90 TAB | Refills: 3 | Status: SHIPPED | OUTPATIENT
Start: 2019-06-28

## 2019-06-28 RX ORDER — DULOXETIN HYDROCHLORIDE 30 MG/1
30 CAPSULE, DELAYED RELEASE ORAL DAILY
Qty: 30 CAP | Refills: 1 | Status: SHIPPED | OUTPATIENT
Start: 2019-06-28 | End: 2019-07-24

## 2019-06-28 RX ORDER — TAMSULOSIN HYDROCHLORIDE 0.4 MG/1
CAPSULE ORAL
Qty: 90 CAP | Refills: 4 | Status: SHIPPED | OUTPATIENT
Start: 2019-06-28

## 2019-06-28 NOTE — PROGRESS NOTES
[address] [place] [address]   Office [phone], Fax [phone]    Subjective:     Chief Complaint   Patient presents with    Back Pain     times one year    Leg Pain     cramping     History provided by patient     HPI:  Cristi Holloway is a 61 y.o.  male presents for HTN and depression f/u. Significant history pertinent to presentation includes chronic back pain, BPH, HTN, depression. HTN  - BP at home ranges from 122-130 for SBP and 70-80 for DBP.   - Diet: barely eats. - Exercise: limited by back and work   - Alcohol use: none . +smoker   - Denies HA, vision changes. - Currently on lisinopril 10mg and Norvasc 5mg. Depression  - Mood is improved but thinks it can be improved. On prozac 10mg. Tried cymbalta before did not feel like it helped him.      Back pain  - Started with stroke about 10 years ago when he had a fall. Has b/l sciatica pain. Pt has been seen by sports specailist and given sacroilliac joint injections (2x). Initially pt was told to go for PT but only started going 2 month ago. Currently going to [facility] (once a week and doing home exercise 3-4x a day). Denies fever/chills, new weakness, urine/bowel incontinence, unintended weight loss, saddle anesthesia  - CT spine [facility] 10/2018 with no acute fracture but multilevel degenerative disease @ T12- L4, facet arthropathy, spinal stenosis. Needs refill for flomax (working well for him). Medication reviewed. Allergy reviewed. SocHx. - social alcohol use, no illcit drug use.    - Occupation: currently not employed     ROS (bolded are positive):   General Negative for fever, chills, changes in weight, changes in appetite   CV Negative for chest pain, palpitations, edema   Respiratory Negative for cough, shortness of breath, wheezing   GI Negative for change in bowel habits, abdominal pain, black or bloody stools, nausea or vomiting    Negative for frequency, dysuria, hematuria, vaginal discharge   MSK Negative for back pain, joint pain, muscle pain   Skin Negative for itching, rash, hives   Neuro Negative for dizziness, headache, confusion, weakness   Psych Negative for anxiety, depression, change in mood     Objective:   Vitals - reviewed  Visit Vitals  Visit Vitals  /77   Pulse 98   Temp 96.9 °F (36.1 °C) (Oral)   Resp 16   Ht 5' 4\" (1.626 m)   Wt 199 lb (90.3 kg)   SpO2 98%   BMI 34.16 kg/m²       Physical exam:   GEN: NAD. Alert. Well nourished. EYES:  Conjunctiva clear; PERRLA. extraocular movements are intact. NECK:  Supple; no masses; thyroid normal           LUNGS: Respirations unlabored; CTAB. no wheeze, rales, rhonchi   CARDIOVASCULAR: Regular, rate, and rhythm without murmurs, gallops or rubs   ABDOMEN: Soft; NT, ND. +bowel sounds; no rebound tenderness, no guarding. no masses or organomegaly  NEUROLOGIC: No focal neurologic deficits. Strength and sensation intact. MSK: FROM but limited by pain. Good tone. B/l lower back paraspinal tenderness. EXT: Well perfused. No edema. No erythema. PSYCH: appropriate mood and affect. Good insight and judgement. Cooperative. SKIN: No obvious rashes or lesions. Pertinent Labs/Studies:   - TSH 4.3, baseline EKG sinus tach 4/2019  - BMP on 4/2019 with Cr 1.16 (limited Cr records on file). A1c 6.0 in 1/2019, Lipid panel (, , HDL 35, )  - CT spine [facility] 10/2018 with no acute fracture but multilevel degenerative disease @ T12- L4, facet arthropathy, spinal stenosis. Assessment and orders:       ICD-10-CM ICD-9-CM    1. Essential hypertension I10 401.9 lisinopril (PRINIVIL, ZESTRIL) 10 mg tablet      amLODIPine (NORVASC) 10 mg tablet   2. Moderate episode of recurrent major depressive disorder (HCC) F33.1 296.32 DULoxetine (CYMBALTA) 30 mg capsule         3. Benign prostatic hyperplasia with urinary hesitancy N40.1 600.01 tamsulosin (FLOMAX) 0.4 mg capsule    R39.11 788.64    4.  Chronic bilateral low back pain with bilateral sciatica M54.42 724.2 DULoxetine (CYMBALTA) 30 mg capsule    M54.41 724.3     G89.29 338.29    5. Spinal stenosis of lumbar region without neurogenic claudication M48.061 724.02 DULoxetine (CYMBALTA) 30 mg capsule   6. Class 1 obesity due to excess calories without serious comorbidity with body mass index (BMI) of 34.0 to 34.9 in adult E66.09 278.00     Z68.34 V85.34      Diagnoses and all orders for this visit:    1. Essential hypertension. BP at goal. On amlodipine 10mg and lisinopril 10mg. Encourage diet and exercise. -     lisinopril (PRINIVIL, ZESTRIL) 10 mg tablet; TAKE ONE TABLET BY MOUTH ONE TIME DAILY  -     amLODIPine (NORVASC) 10 mg tablet; Take 1 Tab by mouth daily. 2. Moderate episode of recurrent major depressive disorder. Will change prozac to cymbalta given his sciatica pain as well. No SI/HI.         - DULoxetine (CYMBALTA) 30 mg capsule    3. Benign prostatic hyperplasia with urinary hesitancy. Stable. Refilled. -     tamsulosin (FLOMAX) 0.4 mg capsule; TAKE ONE CAPSULE BY MOUTH ONE TIME DAILY    4. Chronic bilateral low back pain with bilateral sciatica. Pt needs to at least have 3 month of PT. Given letter for PT to have twice a week PT. To return if no improvement. Next steps could be MRI vs referral to surgery. Advised to f/u with Dr Timmy Olivo.         -     DULoxetine (CYMBALTA) 30 mg capsule    5. Spinal stenosis of lumbar region without neurogenic claudication. Refer to #4.         - DULoxetine (CYMBALTA) 30 mg capsule      Follow-up and Dispositions    · Return in about 1 month (around 7/28/2019) for Back pain with Dr Timmy Olivo. Pt was discussed with Dr Flori Real (attending physician). I have reviewed patient medical and social history and medications. I have reviewed pertinent labs results and other data. I have discussed the diagnosis with the patient and the intended plan as seen in the above orders.  The patient has received an after-visit summary and questions were answered concerning future plans. I have discussed medication side effects and warnings with the patient as well.     Myron Mortimer, MD  Resident [facility]  06/28/19

## 2019-06-28 NOTE — LETTER
6/28/2019 2:00 PM 
 
Mr. Nichole Alcala [address] Re: Nichole Alcala 1960 Patient needs to have at twice a week physical therapy for total of 3 month. Please let us know how he is doing and if it is helping him.   
 
 
Sincerely, 
 
 
Jessenia Tomas MD

## 2019-06-28 NOTE — TELEPHONE ENCOUNTER
[facility] pharmacy calling to verify dosage 30 mg on directions, but Rx states 40 mg. Relayed to pharmacy per nurse Zelalem HICKS) dose is 40 mg.     She asked that I let doctor know that 40 mg cost over $100.00 and she can get the 30 mg for $13.00      [facility] [phone]

## 2019-06-28 NOTE — PROGRESS NOTES
Chief Complaint   Patient presents with    Back Pain     times one year    Leg Pain     cramping     1. Have you been to the ER, urgent care clinic since your last visit? Hospitalized since your last visit? No    2. Have you seen or consulted any other health care providers outside of the [address] [place] [address] since your last visit? Include any pap smears or colon screening.  No

## 2019-06-28 NOTE — PATIENT INSTRUCTIONS
Duloxetine (By mouth) Duloxetine (doo-LOX-e-teen) Treats depression, anxiety, diabetic peripheral neuropathy, fibromyalgia, and chronic muscle or bone pain. This medicine is an SSNRI. Brand Name(s): Cymbalta, DermacinRx MALINA Rick Wade There may be other brand names for this medicine. When This Medicine Should Not Be Used: This medicine is not right for everyone. Do not use it if you had an allergic reaction to duloxetine. How to Use This Medicine:  
Capsule, Delayed Release Capsule · Take your medicine as directed. Your dose may need to be changed several times to find what works best for you. · Delayed-release capsule: Swallow the capsule whole. Do not crush, chew, break, or open it. · This medicine should come with a Medication Guide. Ask your pharmacist for a copy if you do not have one. · Missed dose: Take a dose as soon as you remember. If it is almost time for your next dose, wait until then and take a regular dose. Do not take extra medicine to make up for a missed dose. · Store the medicine in a closed container at room temperature, away from heat, moisture, and direct light. Drugs and Foods to Avoid: Ask your doctor or pharmacist before using any other medicine, including over-the-counter medicines, vitamins, and herbal products. · Do not take duloxetine if you have used an MAO inhibitor (MAOI) within the past 14 days. Do not start taking an MAO inhibitor within 5 days of stopping duloxetine. · Some medicines can affect how duloxetine works. Tell your doctor if you are using any of the following: 
¨ Buspirone, cimetidine, ciprofloxacin, enoxacin, fentanyl, lithium, Maite's wort, theophylline, tramadol, tryptophan, or warfarin ¨ Amphetamines ¨ Blood pressure medicine ¨ Diuretic (water pill) ¨ Medicine for heart rhythm problems (including flecainide, propafenone, quinidine) ¨ Medicine to treat migraine headaches (including triptans) ¨ NSAID pain or arthritis medicine (including aspirin, celecoxib, diclofenac, ibuprofen, naproxen) ¨ Other medicine to treat depression or mood disorders (including amitriptyline, desipramine, fluoxetine, imipramine, nortriptyline, paroxetine) ¨ Phenothiazine medicine (including thioridazine) · Tell your doctor if you use anything else that makes you sleepy. Some examples are allergy medicine, narcotic pain medicine, and alcohol. · Do not drink alcohol while you are using this medicine. Warnings While Using This Medicine: · Tell your doctor if you are pregnant or breastfeeding, or if you have kidney disease, liver disease, diabetes, digestion problems, glaucoma, heart disease, high or low blood pressure, or problems with urination. Tell your doctor if you smoke or you have a history of seizures, or drug or alcohol addiction. · This medicine may cause the following problems:  
¨ Serious liver problems ¨ Serotonin syndrome (more likely when used with certain other medicines) ¨ Increased risk of bleeding problems ¨ Serious skin reactions ¨ Low sodium levels in the blood · This medicine can increase thoughts of suicide. Tell your doctor right away if you start to feel depressed and have thoughts about hurting yourself. · This medicine can cause changes in your blood pressure. This may make you dizzy or drowsy. Do not drive or do anything that could be dangerous until you know how this medicine affects you. Stand up slowly to avoid falls. · Do not stop using this medicine suddenly. Your doctor will need to slowly decrease your dose before you stop it completely. · Your doctor will check your progress and the effects of this medicine at regular visits. Keep all appointments. · Keep all medicine out of the reach of children. Never share your medicine with anyone. Possible Side Effects While Using This Medicine:  
Call your doctor right away if you notice any of these side effects: · Allergic reaction: Itching or hives, swelling in your face or hands, swelling or tingling in your mouth or throat, chest tightness, trouble breathing · Anxiety, restlessness, fever, fast heartbeat, sweating, muscle spasms, diarrhea, seeing or hearing things that are not there · Blistering, peeling, red skin rash · Confusion, weakness, muscle twitching · Dark urine or pale stools, nausea, vomiting, loss of appetite, stomach pain, yellow skin or eyes · Decrease in how much or how often you urinate · Eye pain, vision changes, seeing halos around lights · Feeling more energetic than usual 
· Lightheadedness, dizziness, or fainting · Unusual moods or behaviors, worsening depression, thoughts about hurting yourself, trouble sleeping · Unusual bleeding or bruising If you notice these less serious side effects, talk with your doctor: · Decrease in appetite or weight · Dry mouth, constipation, mild nausea · Unusual drowsiness, sleepiness, or tiredness If you notice other side effects that you think are caused by this medicine, tell your doctor. Call your doctor for medical advice about side effects. You may report side effects to FDA at 1-889-FDA-8542 © [facility] Information is for End User's use only and may not be sold, redistributed or otherwise used for commercial purposes. The above information is an  only. It is not intended as medical advice for individual conditions or treatments. Talk to your doctor, nurse or pharmacist before following any medical regimen to see if it is safe and effective for you.

## 2019-07-02 ENCOUNTER — HOSPITAL ENCOUNTER (OUTPATIENT)
Dept: PHYSICAL THERAPY | Age: 59
Discharge: HOME | End: 2019-07-02
Payer: SUBSIDIZED

## 2019-07-02 PROCEDURE — 97110 THERAPEUTIC EXERCISES: CPT | Performed by: PHYSICAL MEDICINE & REHABILITATION

## 2019-07-02 NOTE — PROGRESS NOTES
PT DAILY TREATMENT NOTE - [facility] 2-15    Patient Name: Adiel Churchill  Date:2019  : 1960  [x]  Patient  Verified  Payor: Luisa Germain / Plan: [facility] % / Product Type: Andrés Broderick /    In time:130 pm  Out time:230 pm  Total Treatment Time (min):60  Total Timed Codes (min): 60  1:1 Treatment Time ( only): -   Visit #: 6      Treatment Area: Sacrococcygeal disorders, not elsewhere classified [M53.3]  Benign prostatic hyperplasia with lower urinary tract symptoms [N40.1]    SUBJECTIVE  Pain Level (0-10 scale): 7/10  Any medication changes, allergies to medications, adverse drug reactions, diagnosis change, or new procedure performed?: [x] No    [] Yes (see summary sheet for update)  Subjective functional status/changes:   [] No changes reported  Patient reported his MD told him to come 2 x week for 3 months. OBJECTIVE     60 min Therapeutic Exercise:  [x] See flow sheet :   Rationale: increase ROM, increase strength and improve coordination to improve the patients ability to ADLs, standing and walking tolerance            With   [x] TE   [] TA   [] neuro   [] other: Patient Education: [x] Review HEP    [] Progressed/Changed HEP based on:   [] positioning   [] body mechanics   [] transfers   [] heat/ice application    [] other:      Other Objective/Functional Measures:   No increase in pain with standing therex but mod fatigue. Pain Level (0-10 scale) post treatment: 6/10    ASSESSMENT/Changes in Function:     Patient will continue to benefit from skilled PT services to modify and progress therapeutic interventions, address functional mobility deficits, address ROM deficits, address strength deficits, analyze and address soft tissue restrictions, analyze and cue movement patterns, analyze and modify body mechanics/ergonomics and assess and modify postural abnormalities to attain remaining goals.      []  See Plan of Care  []  See progress note/recertification  []  See Discharge Summary         Progress towards goals / Updated goals:  Patient is showing little to no progress and will continue for 4 weeks.  If progress is limited then will refer back to MD.    PLAN  [x]  Upgrade activities as tolerated     [x]  Continue plan of care  [x]  Update interventions per flow sheet       []  Discharge due to:_  []  Other:_      Fede Gonzalez PTA, CPT 7/2/2019

## 2019-07-02 NOTE — TELEPHONE ENCOUNTER
I have changed it to 30mg daily to the pharmacy, if you could let the pharmacy/family know.      Thanks DA

## 2019-07-09 ENCOUNTER — HOSPITAL ENCOUNTER (OUTPATIENT)
Dept: PHYSICAL THERAPY | Age: 59
Discharge: HOME | End: 2019-07-09
Payer: SUBSIDIZED

## 2019-07-09 PROCEDURE — 97014 ELECTRIC STIMULATION THERAPY: CPT | Performed by: PHYSICAL MEDICINE & REHABILITATION

## 2019-07-09 PROCEDURE — 97110 THERAPEUTIC EXERCISES: CPT | Performed by: PHYSICAL MEDICINE & REHABILITATION

## 2019-07-09 PROCEDURE — 97012 MECHANICAL TRACTION THERAPY: CPT | Performed by: PHYSICAL MEDICINE & REHABILITATION

## 2019-07-09 NOTE — PROGRESS NOTES
PT DAILY TREATMENT NOTE - [facility] 2-15    Patient Name: Odalys Pabon  Date:2019  : 1960  [x]  Patient  Verified  Payor: Kaz Bird / Plan: [facility] % / Product Type: Aye Caroline /    In time:300 pm  Out time:415 pm  Total Treatment Time (min):75  Total Timed Codes (min): 45  1:1 Treatment Time ( only): -   Visit #: 7      Treatment Area: Sacrococcygeal disorders, not elsewhere classified [M53.3]  Benign prostatic hyperplasia with lower urinary tract symptoms [N40.1]    SUBJECTIVE  Pain Level (0-10 scale): 610  Any medication changes, allergies to medications, adverse drug reactions, diagnosis change, or new procedure performed?: [x] No    [] Yes (see summary sheet for update)  Subjective functional status/changes:   [] No changes reported  Patient reported he is a little dizzy and light headed today. Patient stated his low back pain isn't as much central but in the hips and down the legs.      OBJECTIVE     Modality rationale: decrease inflammation, decrease pain and increase tissue extensibility to improve the patients ability to ADLs, sleeping and standing tolerance   Min Type Additional Details    15 [x] Estim: []Att   [x]Unatt        []TENS instruct                  [x]IFC  []Premod   []NMES                     []Other:  []w/US   []w/ice   [x]w/heat  Position: supine   Location: low back       15  [x]  Traction: [] Cervical       [x]Lumbar                       [] Prone          [x]Supine                       []Intermittent   [x]Continuous Lbs: 100  [] before manual  [] after manual  [x]w/heat      []  Ultrasound: []Continuous   [] Pulsed at:                           []1MHz   []3MHz Location:  W/cm2:      [] Paraffin         Location:   []w/heat      []  Ice     [x]  Heat  []  Ice massage Position:seated  Location: back      []  Laser  []  Other: Position:  Location:         []  Vasopneumatic Device Pressure:       [] lo [] med [] hi   Temperature:        [x] Skin assessment post-treatment: [x]intact []redness- no adverse reaction    []redness  adverse reaction:         45 min Therapeutic Exercise:  [x] See flow sheet :   Rationale: increase ROM, increase strength and improve coordination to improve the patients ability to ADLs, standing and walking tolerance            With   [x] TE   [] TA   [] neuro   [] other: Patient Education: [x] Review HEP    [] Progressed/Changed HEP based on:   [] positioning   [] body mechanics   [] transfers   [] heat/ice application    [] other:      Other Objective/Functional Measures:   No increase in pain with standing therex but mod fatigue. Pain Level (0-10 scale) post treatment: 6/10    ASSESSMENT/Changes in Function:     Patient will continue to benefit from skilled PT services to modify and progress therapeutic interventions, address functional mobility deficits, address ROM deficits, address strength deficits, analyze and address soft tissue restrictions, analyze and cue movement patterns, analyze and modify body mechanics/ergonomics and assess and modify postural abnormalities to attain remaining goals. []  See Plan of Care  []  See progress note/recertification  []  See Discharge Summary         Progress towards goals / Updated goals:  Patient is showing little to no progress and will continue for 4 weeks.  If progress is limited then will refer back to MD.    PLAN  [x]  Upgrade activities as tolerated     [x]  Continue plan of care  [x]  Update interventions per flow sheet       []  Discharge due to:_  []  Other:_      Chanel Cruz PTA, CPT 7/9/2019

## 2019-07-11 ENCOUNTER — HOSPITAL ENCOUNTER (OUTPATIENT)
Dept: PHYSICAL THERAPY | Age: 59
Discharge: HOME | End: 2019-07-11
Payer: SUBSIDIZED

## 2019-07-11 PROCEDURE — 97014 ELECTRIC STIMULATION THERAPY: CPT | Performed by: PHYSICAL MEDICINE & REHABILITATION

## 2019-07-11 PROCEDURE — 97110 THERAPEUTIC EXERCISES: CPT | Performed by: PHYSICAL MEDICINE & REHABILITATION

## 2019-07-11 PROCEDURE — 97012 MECHANICAL TRACTION THERAPY: CPT | Performed by: PHYSICAL MEDICINE & REHABILITATION

## 2019-07-11 NOTE — PROGRESS NOTES
PT DAILY TREATMENT NOTE - [facility] 2-15    Patient Name: Adiel Churchill  Date:2019  : 1960  [x]  Patient  Verified  Payor: Luisa Germain / Plan: [facility] % / Product Type: Bowenrajiv Zengbonnie /    In time:330 pm  Out time:455 pm  Total Treatment Time (min):85  Total Timed Codes (min): 55  1:1 Treatment Time ( [address] only): -   Visit #: 8      Treatment Area: Sacrococcygeal disorders, not elsewhere classified [M53.3]  Benign prostatic hyperplasia with lower urinary tract symptoms [N40.1]    SUBJECTIVE  Pain Level (0-10 scale): 6/10  Any medication changes, allergies to medications, adverse drug reactions, diagnosis change, or new procedure performed?: [x] No    [] Yes (see summary sheet for update)  Subjective functional status/changes:   [] No changes reported  Patient reported he did well after last visit but still has pain down front and back of both legs.     OBJECTIVE     Modality rationale: decrease inflammation, decrease pain and increase tissue extensibility to improve the patients ability to ADLs, sleeping and standing tolerance   Min Type Additional Details    15 [x] Estim: []Att   [x]Unatt        []TENS instruct                  [x]IFC  []Premod   []NMES                     []Other:  []w/US   []w/ice   [x]w/heat  Position: supine   Location: low back       15  [x]  Traction: [] Cervical       [x]Lumbar                       [] Prone          [x]Supine                       []Intermittent   [x]Continuous Lbs: 130  [] before manual  [] after manual  [x]w/heat      []  Ultrasound: []Continuous   [] Pulsed at:                           []1MHz   []3MHz Location:  W/cm2:      [] Paraffin         Location:   []w/heat      []  Ice     [x]  Heat  []  Ice massage Position:seated  Location: back      []  Laser  []  Other: Position:  Location:         []  Vasopneumatic Device Pressure:       [] lo [] med [] hi   Temperature:        [x] Skin assessment post-treatment:  [x]intact []redness- no adverse reaction    []redness  adverse reaction:         55 min Therapeutic Exercise:  [x] See flow sheet :   Rationale: increase ROM, increase strength and improve coordination to improve the patients ability to ADLs, standing and walking tolerance            With   [x] TE   [] TA   [] neuro   [] other: Patient Education: [x] Review HEP    [] Progressed/Changed HEP based on:   [] positioning   [] body mechanics   [] transfers   [] heat/ice application    [] other:      Other Objective/Functional Measures:   No increase in pain with standing therex but mod fatigue. Pain Level (0-10 scale) post treatment: 6/10    ASSESSMENT/Changes in Function:     Patient will continue to benefit from skilled PT services to modify and progress therapeutic interventions, address functional mobility deficits, address ROM deficits, address strength deficits, analyze and address soft tissue restrictions, analyze and cue movement patterns, analyze and modify body mechanics/ergonomics and assess and modify postural abnormalities to attain remaining goals. []  See Plan of Care  []  See progress note/recertification  []  See Discharge Summary         Progress towards goals / Updated goals:  Patient is showing little to no progress and will continue for 4 weeks.  If progress is limited then will refer back to MD.    PLAN  [x]  Upgrade activities as tolerated     [x]  Continue plan of care  [x]  Update interventions per flow sheet       []  Discharge due to:_  []  Other:_      Gustavus Mcardle, PTA, CPT 7/11/2019

## 2019-07-15 ENCOUNTER — HOSPITAL ENCOUNTER (OUTPATIENT)
Dept: PHYSICAL THERAPY | Age: 59
Discharge: HOME | End: 2019-07-15
Payer: SUBSIDIZED

## 2019-07-15 PROCEDURE — 97110 THERAPEUTIC EXERCISES: CPT | Performed by: PHYSICAL MEDICINE & REHABILITATION

## 2019-07-15 PROCEDURE — 97012 MECHANICAL TRACTION THERAPY: CPT | Performed by: PHYSICAL MEDICINE & REHABILITATION

## 2019-07-15 PROCEDURE — 97014 ELECTRIC STIMULATION THERAPY: CPT | Performed by: PHYSICAL MEDICINE & REHABILITATION

## 2019-07-15 NOTE — PROGRESS NOTES
PT DAILY TREATMENT NOTE - [facility] 215    Patient Name: Jorge Hall  Date:7/15/2019  : 1960  [x]  Patient  Verified  Payor: Ely Slice / [facility] / Product Type: [id number] /    In time:300 pm  Out time:415 pm  Total Treatment Time (min):75  Total Timed Codes (min): 45  1:1 Treatment Time ( [address] only): -   Visit #: 9      Treatment Area: Sacrococcygeal disorders, not elsewhere classified [M53.3]  Benign prostatic hyperplasia with lower urinary tract symptoms [N40.1]    SUBJECTIVE  Pain Level (0-10 scale): 6/10  Any medication changes, allergies to medications, adverse drug reactions, diagnosis change, or new procedure performed?: [x] No    [] Yes (see summary sheet for update)  Subjective functional status/changes:   [] No changes reported  Patient reported he continues to have constant pain and radicular pains. Patient states he does his exercises at home.      OBJECTIVE            Modality rationale: decrease inflammation, decrease pain and increase tissue extensibility to improve the patients ability to ADLs, sleeping and standing tolerance   Min Type Additional Details    15 [x] Estim: []Att   [x]Unatt        []TENS instruct                  [x]IFC  []Premod   []NMES                     []Other:  []w/US   []w/ice   [x]w/heat  Position: supine   Location: low back       15  [x]  Traction: [] Cervical       [x]Lumbar                       [] Prone          [x]Supine                       []Intermittent   [x]Continuous Lbs: 140  [] before manual  [] after manual  [x]w/heat      []  Ultrasound: []Continuous   [] Pulsed at:                           []1MHz   []3MHz Location:  W/cm2:      [] Paraffin         Location:   []w/heat      []  Ice     [x]  Heat  []  Ice massage Position:seated  Location: back      []  Laser  []  Other: Position:  Location:         []  Vasopneumatic Device Pressure:       [] lo [] med [] hi   Temperature:        [x] Skin assessment post-treatment:  [x]intact []redness- no adverse reaction    []redness  adverse reaction:         40 min Therapeutic Exercise:  [x] See flow sheet :   Rationale: increase ROM, increase strength and improve coordination to improve the patients ability to ADLs, standing and walking tolerance            With   [x] TE   [] TA   [] neuro   [] other: Patient Education: [x] Review HEP    [] Progressed/Changed HEP based on:   [] positioning   [] body mechanics   [] transfers   [] heat/ice application    [] other:      Other Objective/Functional Measures:   No increase in pain with standing therex but mod fatigue. Pain Level (0-10 scale) post treatment: 6/10    ASSESSMENT/Changes in Function:     Patient will continue to benefit from skilled PT services to modify and progress therapeutic interventions, address functional mobility deficits, address ROM deficits, address strength deficits, analyze and address soft tissue restrictions, analyze and cue movement patterns, analyze and modify body mechanics/ergonomics and assess and modify postural abnormalities to attain remaining goals. []  See Plan of Care  []  See progress note/recertification  []  See Discharge Summary         Progress towards goals / Updated goals:  Patient is showing little to no progress and will continue for 4 weeks.  If progress is limited then will refer back to MD.    PLAN  [x]  Upgrade activities as tolerated     [x]  Continue plan of care  [x]  Update interventions per flow sheet       []  Discharge due to:_  []  Other:_      Iris Cervantes PTA, CPT 7/15/2019

## 2019-07-18 ENCOUNTER — HOSPITAL ENCOUNTER (OUTPATIENT)
Dept: PHYSICAL THERAPY | Age: 59
Discharge: HOME | End: 2019-07-18
Payer: SUBSIDIZED

## 2019-07-18 PROCEDURE — 97012 MECHANICAL TRACTION THERAPY: CPT | Performed by: PHYSICAL MEDICINE & REHABILITATION

## 2019-07-18 PROCEDURE — 97110 THERAPEUTIC EXERCISES: CPT | Performed by: PHYSICAL MEDICINE & REHABILITATION

## 2019-07-18 PROCEDURE — 97014 ELECTRIC STIMULATION THERAPY: CPT | Performed by: PHYSICAL MEDICINE & REHABILITATION

## 2019-07-18 NOTE — PROGRESS NOTES
PT DAILY TREATMENT NOTE - [facility] 2-15    Patient Name: Fracisco Calvo  Date:2019  : 1960  [x]  Patient  Verified  Payor: Aisha Awad / Plan: [facility] % / Product Type: Josefa Simmers /    In time:300 pm  Out time:425 pm  Total Treatment Time (min):85  Total Timed Codes (min): 55  1:1 Treatment Time ( only): -   Visit #: 10      Treatment Area: Sacrococcygeal disorders, not elsewhere classified [M53.3]  Benign prostatic hyperplasia with lower urinary tract symptoms [N40.1]    SUBJECTIVE  Pain Level (0-10 scale): 7/10  Any medication changes, allergies to medications, adverse drug reactions, diagnosis change, or new procedure performed?: [x] No    [] Yes (see summary sheet for update)  Subjective functional status/changes:   [] No changes reported  Patient reported his back is killing him today.       OBJECTIVE            Modality rationale: decrease inflammation, decrease pain and increase tissue extensibility to improve the patients ability to ADLs, sleeping and standing tolerance   Min Type Additional Details    15 [x] Estim: []Att   [x]Unatt        []TENS instruct                  [x]IFC  []Premod   []NMES                     []Other:  []w/US   []w/ice   [x]w/heat  Position: supine   Location: low back       15  [x]  Traction: [] Cervical       [x]Lumbar                       [] Prone          [x]Supine                       []Intermittent   [x]Continuous Lbs: 130  [] before manual  [] after manual  []w/heat      []  Ultrasound: []Continuous   [] Pulsed at:                           []1MHz   []3MHz Location:  W/cm2:      [] Paraffin         Location:   []w/heat      []  Ice     [x]  Heat  []  Ice massage Position:seated  Location: back      []  Laser  []  Other: Position:  Location:         []  Vasopneumatic Device Pressure:       [] lo [] med [] hi   Temperature:        [x] Skin assessment post-treatment:  [x]intact []redness- no adverse reaction    []redness  adverse reaction:         55 min Therapeutic Exercise:  [x] See flow sheet :   Rationale: increase ROM, increase strength and improve coordination to improve the patients ability to ADLs, standing and walking tolerance            With   [x] TE   [] TA   [] neuro   [] other: Patient Education: [x] Review HEP    [] Progressed/Changed HEP based on:   [] positioning   [] body mechanics   [] transfers   [] heat/ice application    [] other:      Other Objective/Functional Measures:   No increase in pain with standing therex but mod fatigue. Pain Level (0-10 scale) post treatment: 7/10    ASSESSMENT/Changes in Function:     Patient will continue to benefit from skilled PT services to modify and progress therapeutic interventions, address functional mobility deficits, address ROM deficits, address strength deficits, analyze and address soft tissue restrictions, analyze and cue movement patterns, analyze and modify body mechanics/ergonomics and assess and modify postural abnormalities to attain remaining goals. []  See Plan of Care  []  See progress note/recertification  []  See Discharge Summary         Progress towards goals / Updated goals:  Patient is showing little to no progress and will continue for 1-2 weeks.  If progress is limited then will refer back to MD.    PLAN  [x]  Upgrade activities as tolerated     [x]  Continue plan of care  [x]  Update interventions per flow sheet       []  Discharge due to:_  []  Other:_      Rob Andersen PTA, CPT 7/18/2019

## 2019-07-22 ENCOUNTER — HOSPITAL ENCOUNTER (OUTPATIENT)
Dept: PHYSICAL THERAPY | Age: 59
Discharge: HOME | End: 2019-07-22
Payer: SUBSIDIZED

## 2019-07-22 PROCEDURE — 97014 ELECTRIC STIMULATION THERAPY: CPT | Performed by: PHYSICAL MEDICINE & REHABILITATION

## 2019-07-22 PROCEDURE — 97110 THERAPEUTIC EXERCISES: CPT | Performed by: PHYSICAL MEDICINE & REHABILITATION

## 2019-07-22 NOTE — PROGRESS NOTES
[facility]  [place]  [facility] [address]  Phone: [phone]      Fax:  [phone]    Progress Note    Name: Jorge Hall   : 1960   MD: Referred, Self, MD       Treatment Diagnosis: Sacrococcygeal disorders, not elsewhere classified [M53.3]  Benign prostatic hyperplasia with lower urinary tract symptoms [N40.1]  Start of Care: 19    Visits from Start of Care: 11  Missed Visits: 0    Summary of Care:  Patient has been seen for 11 visits involving strengthening and balance. Patient also has done electrical stimulation, lumbar traction and moist hot pack with no improvement in low back pain with radiculopathy. Patient has shown little to no progress despite tolerating exercise well with no increase in pain. Due to the limited progress over the past 6-7 weeks with patient verbalizing her has been performing HEP, patient is to follow up with MD for recommendation. Assessment / Recommendations:     Short Term Goals: To be accomplished in 4 weeks:  Pt will be independent with a progressive HEP - MET  Pt will demonstrate good posture with sitting, standing, transitional ADLs - MET  Pt will have no pain with standing 10 minutes - Not Met  Pt will tolerate walking 5 minutes with no increased pain - MET but pain is still elevated.     Long Term Goals: To be accomplished in 8 weeks:  Pt will demonstrate 25% increase in functional trunk ROM  Pt will increase trunk strength by 1 ms grade. Pt will tolerate walking 10 minutes for exercise    Hold therapy and await physician's recommendations. Please advise. Jadyn Smyth PTA, CPT 2019    ________________________________________________________________________  NOTE TO PHYSICIAN:  Please complete the following and fax to: [facility]: [phone]  . Retain this original for your records.   If you are unable to process this request in 24 hours, please contact our office.        ____ I have read the above report and request that my patient continue therapy with the following changes/special instructions:  ____ I have read the above report and request that my patient be discharged from therapy    Physician's Signature:_________________ Date:___________Time:__________

## 2019-07-22 NOTE — PROGRESS NOTES
PT DAILY TREATMENT NOTE - [facility] 2-15    Patient Name: Thanh Wise  Date:2019  : 1960  [x]  Patient  Verified  Payor: Amita Moreno / [facility] / Product Type: [id number] /    In time:255 pm  Out time:355 pm  Total Treatment Time (min):60  Total Timed Codes (min): 45  1:1 Treatment Time ( [address] only): -   Visit #: 11      Treatment Area: Sacrococcygeal disorders, not elsewhere classified [M53.3]  Benign prostatic hyperplasia with lower urinary tract symptoms [N40.1]    SUBJECTIVE  Pain Level (0-10 scale): 610  Any medication changes, allergies to medications, adverse drug reactions, diagnosis change, or new procedure performed?: [x] No    [] Yes (see summary sheet for update)  Subjective functional status/changes:   [] No changes reported  Patient reported his still feels the same with the constant pain in the hips and down the legs.       OBJECTIVE            Modality rationale: decrease inflammation, decrease pain and increase tissue extensibility to improve the patients ability to ADLs, sleeping and standing tolerance   Min Type Additional Details    15 [x] Estim: []Att   [x]Unatt        []TENS instruct                  [x]IFC  []Premod   []NMES                     []Other:  []w/US   []w/ice   [x]w/heat  Position: supine   Location: low back        []  Traction: [] Cervical       []Lumbar                       [] Prone          []Supine                       []Intermittent   []Continuous Lbs:   [] before manual  [] after manual  []w/heat      []  Ultrasound: []Continuous   [] Pulsed at:                           []1MHz   []3MHz Location:  W/cm2:      [] Paraffin         Location:   []w/heat      []  Ice     [x]  Heat  []  Ice massage Position:seated  Location: back      []  Laser  []  Other: Position:  Location:         []  Vasopneumatic Device Pressure:       [] lo [] med [] hi   Temperature:        [x] Skin assessment post-treatment:  [x]intact []redness- no adverse reaction    []redness  adverse reaction:         45 min Therapeutic Exercise:  [x] See flow sheet :   Rationale: increase ROM, increase strength and improve coordination to improve the patients ability to ADLs, standing and walking tolerance            With   [x] TE   [] TA   [] neuro   [] other: Patient Education: [x] Review HEP    [] Progressed/Changed HEP based on:   [] positioning   [] body mechanics   [] transfers   [] heat/ice application    [] other:      Other Objective/Functional Measures: Only increase in pain with PNF lifts but not chops or other LE exercises. Pain Level (0-10 scale) post treatment: 7/10    ASSESSMENT/Changes in Function:     Patient will continue to benefit from skilled PT services to modify and progress therapeutic interventions, address functional mobility deficits, address ROM deficits, address strength deficits, analyze and address soft tissue restrictions, analyze and cue movement patterns, analyze and modify body mechanics/ergonomics and assess and modify postural abnormalities to attain remaining goals. []  See Plan of Care  [x]  See progress note/recertification  []  See Discharge Summary         Progress towards goals / Updated goals:  Patient is making little to no progress towards goals with no decrease in pain with therapy. Patient will follow up with MD Wednesday for next step as pain remains constantly high.      PLAN  [x]  Upgrade activities as tolerated     [x]  Continue plan of care  [x]  Update interventions per flow sheet       []  Discharge due to:_  []  Other:_      Nate Cadet PTA, CPT 7/22/2019

## 2019-07-24 ENCOUNTER — OFFICE VISIT (OUTPATIENT)
Dept: FAMILY MEDICINE CLINIC | Age: 59
End: 2019-07-24

## 2019-07-24 VITALS
HEIGHT: 64 IN | WEIGHT: 204 LBS | OXYGEN SATURATION: 97 % | RESPIRATION RATE: 16 BRPM | SYSTOLIC BLOOD PRESSURE: 102 MMHG | BODY MASS INDEX: 34.83 KG/M2 | DIASTOLIC BLOOD PRESSURE: 67 MMHG | HEART RATE: 110 BPM | TEMPERATURE: 97.6 F

## 2019-07-24 DIAGNOSIS — E66.01 SEVERE OBESITY (HCC): ICD-10-CM

## 2019-07-24 DIAGNOSIS — M54.5 CHRONIC LOW BACK PAIN, UNSPECIFIED BACK PAIN LATERALITY, WITH SCIATICA PRESENCE UNSPECIFIED: Primary | ICD-10-CM

## 2019-07-24 DIAGNOSIS — G89.29 CHRONIC LOW BACK PAIN, UNSPECIFIED BACK PAIN LATERALITY, WITH SCIATICA PRESENCE UNSPECIFIED: Primary | ICD-10-CM

## 2019-07-24 RX ORDER — METHYLPREDNISOLONE 4 MG/1
TABLET ORAL
Qty: 1 DOSE PACK | Refills: 0 | Status: SHIPPED | OUTPATIENT
Start: 2019-07-24

## 2019-07-24 NOTE — PROGRESS NOTES
Chief Complaint   Patient presents with    LOW BACK PAIN     1. Have you been to the ER, urgent care clinic since your last visit? Hospitalized since your last visit? No    2. Have you seen or consulted any other health care providers outside of the [address] [place] [address] since your last visit? Include any pap smears or colon screening.  No

## 2019-07-24 NOTE — PROGRESS NOTES
History of Present Illness     Patient Identification  Fracisco Calvo is a 61 y.o. male complains of pain in the bilateral back pain. No bowel or bladder incontinence perianal numbness. Date of Onset: 2 weeks ago, but has chronic back pain for over 5 years  Mechanism of Injury: Lise Silver 2 weeks ago from standing position onto tile, states he lost his balance. No LOC  Alleviating Factors:   Rest  Aggravating Factors: Bending backwards     Wife is concerned patient's gait has become more shuffled and he is slowing down. No past medical history on file. No family history on file. Current Outpatient Medications   Medication Sig Dispense Refill    methylPREDNISolone (MEDROL DOSEPACK) 4 mg tablet Take as directed 1 Dose Pack 0    lisinopril (PRINIVIL, ZESTRIL) 10 mg tablet TAKE ONE TABLET BY MOUTH ONE TIME DAILY 90 Tab 3    amLODIPine (NORVASC) 10 mg tablet Take 1 Tab by mouth daily. 90 Tab 3    tamsulosin (FLOMAX) 0.4 mg capsule TAKE ONE CAPSULE BY MOUTH ONE TIME DAILY 90 Cap 4    fluvastatin (LESCOL) 20 mg capsule Take 1 Cap by mouth nightly. 30 Cap 0    naproxen sodium (ALEVE) 220 mg cap Take  by mouth. Allergies   Allergen Reactions    Azithromycin Hives and Itching    Erythromycin Hives and Itching       Review of Systems  A comprehensive review of systems was negative except for that written in the HPI. Physical Exam     Visit Vitals  /67   Pulse (!) 110   Temp 97.6 °F (36.4 °C) (Oral)   Resp 16   Ht 5' 4\" (1.626 m)   Wt 204 lb (92.5 kg)   SpO2 97%   BMI 35.02 kg/m²       General: Alert and oriented and in no acute distress. Responds to all questions appropriately  LUNGS: Respirations unlabored  Skin: No obvious rash    MSK:    Posture: Normal   Deformity: None    ROM:     Lumbar Flexion: Normal    Lumbar Extension: Normal, but painful    Lateral bending: Normal     Hip Flexion: Normal     Gait: Slow       Palpation:    L1-L5: No Tenderness    Sacrum: No Tenderness    Coccyx:  No Tenderness    Paraspinal:   Left: Tenderness Right: Tenderness    Sacroiliac Joint:  Left: Tenderness Right: Tenderness    Piriformis Muscle:  Left: No Tenderness Right: No Tenderness    Greater Trochanter:   Left: No Tenderness Right: No Tenderness    Ischial Tuberosity:  Left: No Tenderness Right: No Tenderness      Strength (0-5/5)    Hip Flexion:   Left: 5/5  Right: 5/5    Hip Extension:   Left: 5/5  Right: 5/5    Hip Abduction:  Left: 5/5  Right: 5/5    Hip Adduction:   Left: 5/5  Right: 5/5    Knee Extension:  Left: 5/5  Right: 5/5    Knee Flexion:   Left: 5/5  Right: 5/5    Ankle dorsiflexion:  Left: 5/5  Right: 5/5    Ankle plantarflexion:  Left: 5/5  Right: 5/5    Great toe extension:  Left: 5/5  Right: 5/5     Sensation: L4-S1 intact, no deficits noted     DTR:    Patella:  Left: +1  Right: +1    Achilles:  Left: +1  Right: +1     Special test:    JOSE:  Left: Negative  Right: Negative    FADIR:  Left: Negative  Right: Negative    Piriformis:  Left: Negative  Right: Negative    Stinchfield:  Left: Negative  Right: Negative    No results found for any visits on 07/24/19. Assessment:    ICD-10-CM ICD-9-CM    1. Chronic low back pain, unspecified back pain laterality, with sciatica presence unspecified M54.5 724.2 REFERRAL TO NEUROLOGY    G89.29 338.29 MRI LUMB SPINE WO CONT      methylPREDNISolone (MEDROL DOSEPACK) 4 mg tablet   2. Severe obesity ([address] E66.01 278.01        Plan:  1. Patient has received temporary relief from injections to the sacroiliac joint and lateral femoral cutaneous nerve. He has failed physical therapy. Over-the-counter medications has not been helpful. We will get an MRI and also try a short course of steroids. Discussed various treatment options and they are concerned as they have no health insurance. We will see what the MRI shows. We will also refer to neurology for his gait issues and concern for parkinsonian-like symptoms.     Follow-up and Dispositions    · Return in about 1 month (around 8/24/2019) for DM and Preventative visit .

## 2019-07-24 NOTE — PATIENT INSTRUCTIONS
Back Pain: Care Instructions  Your Care Instructions    Back pain has many possible causes. It is often related to problems with muscles and ligaments of the back. It may also be related to problems with the nerves, discs, or bones of the back. Moving, lifting, standing, sitting, or sleeping in an awkward way can strain the back. Sometimes you don't notice the injury until later. Arthritis is another common cause of back pain. Although it may hurt a lot, back pain usually improves on its own within several weeks. Most people recover in 12 weeks or less. Using good home treatment and being careful not to stress your back can help you feel better sooner. Follow-up care is a key part of your treatment and safety. Be sure to make and go to all appointments, and call your doctor if you are having problems. It's also a good idea to know your test results and keep a list of the medicines you take. How can you care for yourself at home? · Sit or lie in positions that are most comfortable and reduce your pain. Try one of these positions when you lie down:  ? Lie on your back with your knees bent and supported by large pillows. ? Lie on the floor with your legs on the seat of a sofa or chair. ? Lie on your side with your knees and hips bent and a pillow between your legs. ? Lie on your stomach if it does not make pain worse. · Do not sit up in bed, and avoid soft couches and twisted positions. Bed rest can help relieve pain at first, but it delays healing. Avoid bed rest after the first day of back pain. · Change positions every 30 minutes. If you must sit for long periods of time, take breaks from sitting. Get up and walk around, or lie in a comfortable position. · Try using a heating pad on a low or medium setting for 15 to 20 minutes every 2 or 3 hours. Try a warm shower in place of one session with the heating pad. · You can also try an ice pack for 10 to 15 minutes every 2 to 3 hours.  Put a thin cloth between the ice pack and your skin. · Take pain medicines exactly as directed. ? If the doctor gave you a prescription medicine for pain, take it as prescribed. ? If you are not taking a prescription pain medicine, ask your doctor if you can take an over-the-counter medicine. · Take short walks several times a day. You can start with 5 to 10 minutes, 3 or 4 times a day, and work up to longer walks. Walk on level surfaces and avoid hills and stairs until your back is better. · Return to work and other activities as soon as you can. Continued rest without activity is usually not good for your back. · To prevent future back pain, do exercises to stretch and strengthen your back and stomach. Learn how to use good posture, safe lifting techniques, and proper body mechanics. When should you call for help? Call your doctor now or seek immediate medical care if:    · You have new or worsening numbness in your legs.     · You have new or worsening weakness in your legs. (This could make it hard to stand up.)     · You lose control of your bladder or bowels.    Watch closely for changes in your health, and be sure to contact your doctor if:    · You have a fever, lose weight, or don't feel well.     · You do not get better as expected. Where can you learn more? Go to http://tracy-tana.info/. Enter O393 in the search box to learn more about \"Back Pain: Care Instructions. \"  Current as of: September 20, 2018  Content Version: 12.1  © 7854-3546 [employer]. Care instructions adapted under license by [employer] (which disclaims liability or warranty for this information). If you have questions about a medical condition or this instruction, always ask your healthcare professional. [place] [address] any warranty or liability for your use of this information.

## 2019-07-25 ENCOUNTER — HOSPITAL ENCOUNTER (OUTPATIENT)
Dept: PHYSICAL THERAPY | Age: 59
Discharge: HOME | End: 2019-07-25
Payer: SUBSIDIZED

## 2019-07-25 ENCOUNTER — HOSPITAL ENCOUNTER (OUTPATIENT)
Dept: MRI IMAGING | Age: 59
Discharge: HOME | End: 2019-07-25
Attending: FAMILY MEDICINE
Payer: SUBSIDIZED

## 2019-07-25 DIAGNOSIS — G89.29 CHRONIC LOW BACK PAIN, UNSPECIFIED BACK PAIN LATERALITY, WITH SCIATICA PRESENCE UNSPECIFIED: ICD-10-CM

## 2019-07-25 DIAGNOSIS — M54.5 CHRONIC LOW BACK PAIN, UNSPECIFIED BACK PAIN LATERALITY, WITH SCIATICA PRESENCE UNSPECIFIED: ICD-10-CM

## 2019-07-25 PROCEDURE — 97014 ELECTRIC STIMULATION THERAPY: CPT | Performed by: PHYSICAL MEDICINE & REHABILITATION

## 2019-07-25 PROCEDURE — 97110 THERAPEUTIC EXERCISES: CPT | Performed by: PHYSICAL MEDICINE & REHABILITATION

## 2019-07-25 PROCEDURE — 72148 MRI LUMBAR SPINE W/O DYE: CPT

## 2019-07-25 NOTE — PROGRESS NOTES
PT DAILY TREATMENT NOTE - [facility] 2-15    Patient Name: Yanci Carnes  Date:2019  : 1960  [x]  Patient  Verified  Payor: Musa Myers / Plan: BSI % / Product Type: [id number] /    In time:255 pm  Out time:355 pm  Total Treatment Time (min):60  Total Timed Codes (min): 45  1:1 Treatment Time ( [address] only): -   Visit #: 12      Treatment Area: Sacrococcygeal disorders, not elsewhere classified [M53.3]  Benign prostatic hyperplasia with lower urinary tract symptoms [N40.1]    SUBJECTIVE  Pain Level (0-10 scale): 610  Any medication changes, allergies to medications, adverse drug reactions, diagnosis change, or new procedure performed?: [x] No    [] Yes (see summary sheet for update)  Subjective functional status/changes:   [] No changes reported  Patient reported he saw Dr. Vivek Cooper yesterday and was told to follow up with Neurosurgeon and had MRI today. Will be starting steroid injections.      OBJECTIVE            Modality rationale: decrease inflammation, decrease pain and increase tissue extensibility to improve the patients ability to ADLs, sleeping and standing tolerance   Min Type Additional Details    15 [x] Estim: []Att   [x]Unatt        []TENS instruct                  [x]IFC  []Premod   []NMES                     []Other:  []w/US   []w/ice   [x]w/heat  Position: supine   Location: low back        []  Traction: [] Cervical       []Lumbar                       [] Prone          []Supine                       []Intermittent   []Continuous Lbs:   [] before manual  [] after manual  []w/heat      []  Ultrasound: []Continuous   [] Pulsed at:                           []1MHz   []3MHz Location:  W/cm2:      [] Paraffin         Location:   []w/heat      []  Ice     [x]  Heat  []  Ice massage Position:seated  Location: back      []  Laser  []  Other: Position:  Location:         []  Vasopneumatic Device Pressure:       [] lo [] med [] hi   Temperature:        [x] Skin assessment post-treatment:  [x]intact []redness- no adverse reaction    []redness  adverse reaction:         45 min Therapeutic Exercise:  [x] See flow sheet :   Rationale: increase ROM, increase strength and improve coordination to improve the patients ability to ADLs, standing and walking tolerance            With   [x] TE   [] TA   [] neuro   [] other: Patient Education: [x] Review HEP    [] Progressed/Changed HEP based on:   [] positioning   [] body mechanics   [] transfers   [] heat/ice application    [] other:      Other Objective/Functional Measures:   LE tremors present today when resting on bike or between exercises. Pain Level (0-10 scale) post treatment: 2/10    ASSESSMENT/Changes in Function:     Patient will continue to benefit from skilled PT services to modify and progress therapeutic interventions, address functional mobility deficits, address ROM deficits, address strength deficits, analyze and address soft tissue restrictions, analyze and cue movement patterns, analyze and modify body mechanics/ergonomics and assess and modify postural abnormalities to attain remaining goals. []  See Plan of Care  []  See progress note/recertification  []  See Discharge Summary         Progress towards goals / Updated goals:  Patient will hold on therapy after next week and follow up with neurosurgeon and Dr. Kenn Glover at this time.      PLAN  [x]  Upgrade activities as tolerated     [x]  Continue plan of care  [x]  Update interventions per flow sheet       []  Discharge due to:_  []  Other:_      Lauren Paula PTA, CPT 7/25/2019

## 2019-07-29 ENCOUNTER — TELEPHONE (OUTPATIENT)
Dept: FAMILY MEDICINE CLINIC | Age: 59
End: 2019-07-29

## 2019-07-29 ENCOUNTER — HOSPITAL ENCOUNTER (OUTPATIENT)
Dept: PHYSICAL THERAPY | Age: 59
Discharge: HOME | End: 2019-07-29
Payer: SUBSIDIZED

## 2019-07-29 DIAGNOSIS — M48.061 SPINAL STENOSIS OF LUMBAR REGION, UNSPECIFIED WHETHER NEUROGENIC CLAUDICATION PRESENT: ICD-10-CM

## 2019-07-29 DIAGNOSIS — G95.20 SPINAL CORD COMPRESSION (HCC): Primary | ICD-10-CM

## 2019-07-29 PROCEDURE — 97110 THERAPEUTIC EXERCISES: CPT | Performed by: PHYSICAL THERAPIST

## 2019-07-29 PROCEDURE — 97012 MECHANICAL TRACTION THERAPY: CPT | Performed by: PHYSICAL THERAPIST

## 2019-07-29 NOTE — PROGRESS NOTES
PT DAILY TREATMENT NOTE - [facility] 2-15    Patient Name: Yenny Zavala  Date:2019  : 1960  [x]  Patient  Verified  Payor: Angel Mccollum / Plan: [facility] % / Product Type: [id number] /    In time:230 pm  Out time:330 pm  Total Treatment Time (min):60  Total Timed Codes (min): 45  1:1 Treatment Time ( [address] only): -   Visit #: 13      Treatment Area: Sacrococcygeal disorders, not elsewhere classified [M53.3]  Benign prostatic hyperplasia with lower urinary tract symptoms [N40.1]    SUBJECTIVE  Pain Level (0-10 scale): 810  Any medication changes, allergies to medications, adverse drug reactions, diagnosis change, or new procedure performed?: [x] No    [] Yes (see summary sheet for update)  Subjective functional status/changes:   [] No changes reported  Patient has not heard back from the neurosurgeon's office. Dr. Lara Hearing wanted him to continue with PT until a better plan is made with the surgeon. He continues to have significant pain, especially when walking.     OBJECTIVE            Modality rationale: decrease inflammation, decrease pain and increase tissue extensibility to improve the patients ability to ADLs, sleeping and standing tolerance   Min Type Additional Details    15 [x] Estim: []Att   [x]Unatt        []TENS instruct                  [x]IFC  []Premod   []NMES                     []Other:  []w/US   []w/ice   [x]w/heat  Position: supine   Location: low back        []  Traction: [] Cervical       []Lumbar                       [] Prone          []Supine                       []Intermittent   []Continuous Lbs:   [] before manual  [] after manual  []w/heat      []  Ultrasound: []Continuous   [] Pulsed at:                           []1MHz   []3MHz Location:  W/cm2:      [] Paraffin         Location:   []w/heat      []  Ice     [x]  Heat  []  Ice massage Position:seated  Location: back      []  Laser  []  Other: Position:  Location:         []  Vasopneumatic Device Pressure:       [] lo [] med [] hi Temperature:        [x] Skin assessment post-treatment:  [x]intact []redness- no adverse reaction    []redness  adverse reaction:         45 min Therapeutic Exercise:  [x] See flow sheet :   Rationale: increase ROM, increase strength and improve coordination to improve the patients ability to ADLs, standing and walking tolerance            With   [x] TE   [] TA   [] neuro   [] other: Patient Education: [x] Review HEP    [] Progressed/Changed HEP based on:   [] positioning   [] body mechanics   [] transfers   [] heat/ice application    [] other:      Other Objective/Functional Measures:   Lower leg cramping occurred at the end of mech traction, however patient reported significant pain relief. Pain Level (0-10 scale) post treatment: 4/10    ASSESSMENT/Changes in Function:     Patient will continue to benefit from skilled PT services to modify and progress therapeutic interventions, address functional mobility deficits, address ROM deficits, address strength deficits, analyze and address soft tissue restrictions, analyze and cue movement patterns, analyze and modify body mechanics/ergonomics and assess and modify postural abnormalities to attain remaining goals. []  See Plan of Care  []  See progress note/recertification  []  See Discharge Summary         Progress towards goals / Updated goals:  Patient is tolerating all interventions well, but no significant progress has been made towards functional goals. PLAN  [x]  Upgrade activities as tolerated     [x]  Continue plan of care  [x]  Update interventions per flow sheet       []  Discharge due to:_  []  Other:_      Harman Bains, PT  , DPT, OCS, Cert.  DN   7/29/2019

## 2019-07-29 NOTE — TELEPHONE ENCOUNTER
Call patient and left a message to return call. Will refer to orthopedics. Patient on possible care card, which they understand that may not cover any surgery.

## 2019-08-01 ENCOUNTER — HOSPITAL ENCOUNTER (OUTPATIENT)
Dept: PHYSICAL THERAPY | Age: 59
Discharge: HOME | End: 2019-08-01
Payer: SUBSIDIZED

## 2019-08-01 ENCOUNTER — TELEPHONE (OUTPATIENT)
Dept: FAMILY MEDICINE CLINIC | Age: 59
End: 2019-08-01

## 2019-08-01 DIAGNOSIS — M48.061 SPINAL STENOSIS OF LUMBAR REGION, UNSPECIFIED WHETHER NEUROGENIC CLAUDICATION PRESENT: Primary | ICD-10-CM

## 2019-08-01 PROCEDURE — 97012 MECHANICAL TRACTION THERAPY: CPT | Performed by: PHYSICAL THERAPIST

## 2019-08-01 PROCEDURE — 97110 THERAPEUTIC EXERCISES: CPT | Performed by: PHYSICAL THERAPIST

## 2019-08-01 NOTE — PROGRESS NOTES
PT DAILY TREATMENT NOTE - [facility] 2-15    Patient Name: Hugh Kerr  Date:2019  : 1960  [x]  Patient  Verified  Payor: Juma Tyler / [facility] / Product Type: Bre /    In time:230 pm  Out time:330 pm  Total Treatment Time (min):60  Total Timed Codes (min): 45  1:1 Treatment Time ([address] only): -   Visit #: 14      Treatment Area: Sacrococcygeal disorders, not elsewhere classified [M53.3]  Benign prostatic hyperplasia with lower urinary tract symptoms [N40.1]    SUBJECTIVE  Pain Level (0-10 scale): 8/10  Any medication changes, allergies to medications, adverse drug reactions, diagnosis change, or new procedure performed?: [x] No    [] Yes (see summary sheet for update)  Subjective functional status/changes:   [x] No changes reported  \"I'm in so much pain! \"    OBJECTIVE            Modality rationale: decrease inflammation, decrease pain and increase tissue extensibility to improve the patients ability to ADLs, sleeping and standing tolerance   Min Type Additional Details    15 [x] Estim: []Att   [x]Unatt        []TENS instruct                  [x]IFC  []Premod   []NMES                     []Other:  []w/US   []w/ice   [x]w/heat  Position: supine   Location: low back        []  Traction: [] Cervical       []Lumbar                       [] Prone          []Supine                       []Intermittent   []Continuous Lbs:   [] before manual  [] after manual  []w/heat      []  Ultrasound: []Continuous   [] Pulsed at:                           []1MHz   []3MHz Location:  W/cm2:      [] Paraffin         Location:   []w/heat      []  Ice     [x]  Heat  []  Ice massage Position:seated  Location: back      []  Laser  []  Other: Position:  Location:         []  Vasopneumatic Device Pressure:       [] lo [] med [] hi   Temperature:        [x] Skin assessment post-treatment:  [x]intact []redness- no adverse reaction    []redness  adverse reaction:         45 min Therapeutic Exercise:  [x] See flow sheet : Rationale: increase ROM, increase strength and improve coordination to improve the patients ability to ADLs, standing and walking tolerance            With   [x] TE   [] TA   [] neuro   [] other: Patient Education: [x] Review HEP    [] Progressed/Changed HEP based on:   [] positioning   [] body mechanics   [] transfers   [] heat/ice application    [] other:      Other Objective/Functional Measures:       Pain Level (0-10 scale) post treatment: 4/10    ASSESSMENT/Changes in Function:     Patient will continue to benefit from skilled PT services to modify and progress therapeutic interventions, address functional mobility deficits, address ROM deficits, address strength deficits, analyze and address soft tissue restrictions, analyze and cue movement patterns, analyze and modify body mechanics/ergonomics and assess and modify postural abnormalities to attain remaining goals. []  See Plan of Care  []  See progress note/recertification  []  See Discharge Summary         Progress towards goals / Updated goals:  Patient continues to show little to no progress towards functional goals. Will review discharge planning next week. PLAN  [x]  Upgrade activities as tolerated     [x]  Continue plan of care  [x]  Update interventions per flow sheet       []  Discharge due to:_  []  Other:_      Juana Howard, PT  , DPT, OCS, Cert.  DN   8/1/2019

## 2019-08-01 NOTE — TELEPHONE ENCOUNTER
Patient wife calling Haroon Quiles) not on hippa, but relaying message    States that medication is working. However 2nd dose is needed as same symptoms and a little worse.  methylPREDNISolone (MEDROL DOSEPACK) 4 mg tablet       Call [phone]   Pt/Georgie

## 2019-08-02 RX ORDER — METHYLPREDNISOLONE 4 MG/1
TABLET ORAL
Qty: 1 DOSE PACK | Refills: 0 | Status: SHIPPED | OUTPATIENT
Start: 2019-08-02

## 2019-08-02 NOTE — TELEPHONE ENCOUNTER
[phone]  Patient called about Dr. Mellissa Vale referral information. This was given and patient was asked to call back with appointment date.     Primary Contact Information     Phone Fax E-mail Address   [phone]

## 2019-08-05 ENCOUNTER — HOSPITAL ENCOUNTER (OUTPATIENT)
Dept: PHYSICAL THERAPY | Age: 59
Discharge: HOME | End: 2019-08-05
Payer: SUBSIDIZED

## 2019-08-05 PROCEDURE — 97110 THERAPEUTIC EXERCISES: CPT | Performed by: PHYSICAL THERAPIST

## 2019-08-05 PROCEDURE — 97012 MECHANICAL TRACTION THERAPY: CPT | Performed by: PHYSICAL THERAPIST

## 2019-08-05 NOTE — PROGRESS NOTES
PT DAILY TREATMENT NOTE - [facility] 2-15    Patient Name: Katina Higgins  Date:2019  : 1960  [x]  Patient  Verified  Payor: ADÁN / Plan: [facility] % / Product Type: [address] /    In time:230 pm  Out time:330 pm  Total Treatment Time (min):60  Total Timed Codes (min): 45  1:1 Treatment Time ( [address] only): -   Visit #: 15      Treatment Area: Sacrococcygeal disorders, not elsewhere classified [M53.3]  Benign prostatic hyperplasia with lower urinary tract symptoms [N40.1]    SUBJECTIVE  Pain Level (0-10 scale): 910  Any medication changes, allergies to medications, adverse drug reactions, diagnosis change, or new procedure performed?: [x] No    [] Yes (see summary sheet for update)  Subjective functional status/changes:   [x] No changes reported      OBJECTIVE            Modality rationale: decrease inflammation, decrease pain and increase tissue extensibility to improve the patients ability to ADLs, sleeping and standing tolerance   Min Type Additional Details    15 [x] Estim: []Att   [x]Unatt        []TENS instruct                  [x]IFC  []Premod   []NMES                     []Other:  []w/US   []w/ice   [x]w/heat  Position: supine   Location: low back        []  Traction: [] Cervical       []Lumbar                       [] Prone          []Supine                       []Intermittent   []Continuous Lbs:   [] before manual  [] after manual  []w/heat      []  Ultrasound: []Continuous   [] Pulsed at:                           []1MHz   []3MHz Location:  W/cm2:      [] Paraffin         Location:   []w/heat      []  Ice     [x]  Heat  []  Ice massage Position:seated  Location: back      []  Laser  []  Other: Position:  Location:         []  Vasopneumatic Device Pressure:       [] lo [] med [] hi   Temperature:        [x] Skin assessment post-treatment:  [x]intact []redness- no adverse reaction    []redness  adverse reaction:         45 min Therapeutic Exercise:  [x] See flow sheet :   Rationale: increase ROM, increase strength and improve coordination to improve the patients ability to ADLs, standing and walking tolerance            With   [x] TE   [] TA   [] neuro   [] other: Patient Education: [x] Review HEP    [] Progressed/Changed HEP based on:   [] positioning   [] body mechanics   [] transfers   [] heat/ice application    [] other:      Other Objective/Functional Measures:       Pain Level (0-10 scale) post treatment: 4/10    ASSESSMENT/Changes in Function:     Patient will continue to benefit from skilled PT services to modify and progress therapeutic interventions, address functional mobility deficits, address ROM deficits, address strength deficits, analyze and address soft tissue restrictions, analyze and cue movement patterns, analyze and modify body mechanics/ergonomics and assess and modify postural abnormalities to attain remaining goals. []  See Plan of Care  []  See progress note/recertification  [x]  See Discharge Summary           PLAN  [x]  Upgrade activities as tolerated     [x]  Continue plan of care  [x]  Update interventions per flow sheet       []  Discharge due to:_  []  Other:_      Lopez Caceres, PT  , DPT, OCS, Cert.  DN   8/5/2019

## 2019-08-08 ENCOUNTER — HOSPITAL ENCOUNTER (OUTPATIENT)
Dept: PHYSICAL THERAPY | Age: 59
Discharge: HOME | End: 2019-08-08
Payer: SUBSIDIZED

## 2019-08-08 PROCEDURE — 97110 THERAPEUTIC EXERCISES: CPT | Performed by: PHYSICAL MEDICINE & REHABILITATION

## 2019-08-08 PROCEDURE — 97014 ELECTRIC STIMULATION THERAPY: CPT | Performed by: PHYSICAL MEDICINE & REHABILITATION

## 2019-08-08 NOTE — PROGRESS NOTES
PT DAILY TREATMENT NOTE - [facility] 2-15    Patient Name: Edward Justin  Date:2019  : 1960  [x]  Patient  Verified  Payor: Nicholas Muse / [facility] / Product Type: [id number] /    In time:300 pm  Out time:400 pm  Total Treatment Time (min):60  Total Timed Codes (min): 45  1:1 Treatment Time ( only): -   Visit #: 16      Treatment Area: Sacrococcygeal disorders, not elsewhere classified [M53.3]  Benign prostatic hyperplasia with lower urinary tract symptoms [N40.1]    SUBJECTIVE  Pain Level (0-10 scale): 910  Any medication changes, allergies to medications, adverse drug reactions, diagnosis change, or new procedure performed?: [x] No    [] Yes (see summary sheet for update)  Subjective functional status/changes:   [] No changes reported   Patient reported he sees his Neurosurgeon in 1 month.      OBJECTIVE            Modality rationale: decrease inflammation, decrease pain and increase tissue extensibility to improve the patients ability to ADLs, sleeping and standing tolerance   Min Type Additional Details    15 [x] Estim: []Att   [x]Unatt        []TENS instruct                  [x]IFC  []Premod   []NMES                     []Other:  []w/US   []w/ice   [x]w/heat  Position: supine   Location: low back        []  Traction: [] Cervical       []Lumbar                       [] Prone          []Supine                       []Intermittent   []Continuous Lbs:   [] before manual  [] after manual  []w/heat      []  Ultrasound: []Continuous   [] Pulsed at:                           []1MHz   []3MHz Location:  W/cm2:      [] Paraffin         Location:   []w/heat      []  Ice     [x]  Heat  []  Ice massage Position:seated  Location: back      []  Laser  []  Other: Position:  Location:         []  Vasopneumatic Device Pressure:       [] lo [] med [] hi   Temperature:        [x] Skin assessment post-treatment:  [x]intact []redness- no adverse reaction    []redness  adverse reaction:         45 min Therapeutic Exercise:  [x] See flow sheet :   Rationale: increase ROM, increase strength and improve coordination to improve the patients ability to ADLs, standing and walking tolerance            With   [x] TE   [] TA   [] neuro   [] other: Patient Education: [x] Review HEP    [] Progressed/Changed HEP based on:   [] positioning   [] body mechanics   [] transfers   [] heat/ice application    [] other:      Other Objective/Functional Measures:       Pain Level (0-10 scale) post treatment: 4/10    ASSESSMENT/Changes in Function:     Patient will continue to benefit from skilled PT services to modify and progress therapeutic interventions, address functional mobility deficits, address ROM deficits, address strength deficits, analyze and address soft tissue restrictions, analyze and cue movement patterns, analyze and modify body mechanics/ergonomics and assess and modify postural abnormalities to attain remaining goals.      []  See Plan of Care  []  See progress note/recertification  []  See Discharge Summary         Will continue 1 x week till he sees his Neurosurgeon    PLAN  [x]  Upgrade activities as tolerated     [x]  Continue plan of care  [x]  Update interventions per flow sheet       []  Discharge due to:_  []  Other:_      Joel Romano PTA, CPT   8/8/2019

## 2019-08-12 ENCOUNTER — HOSPITAL ENCOUNTER (OUTPATIENT)
Dept: PHYSICAL THERAPY | Age: 59
End: 2019-08-12
Payer: SUBSIDIZED

## 2019-08-15 ENCOUNTER — HOSPITAL ENCOUNTER (OUTPATIENT)
Dept: PHYSICAL THERAPY | Age: 59
Discharge: HOME | End: 2019-08-15
Payer: SUBSIDIZED

## 2019-08-15 PROCEDURE — 97014 ELECTRIC STIMULATION THERAPY: CPT | Performed by: PHYSICAL MEDICINE & REHABILITATION

## 2019-08-15 PROCEDURE — 97110 THERAPEUTIC EXERCISES: CPT | Performed by: PHYSICAL MEDICINE & REHABILITATION

## 2019-08-19 ENCOUNTER — APPOINTMENT (OUTPATIENT)
Dept: PHYSICAL THERAPY | Age: 59
End: 2019-08-19
Payer: SUBSIDIZED

## 2019-08-22 ENCOUNTER — HOSPITAL ENCOUNTER (OUTPATIENT)
Dept: PHYSICAL THERAPY | Age: 59
Discharge: HOME | End: 2019-08-22
Payer: SUBSIDIZED

## 2019-08-22 PROCEDURE — 97016 VASOPNEUMATIC DEVICE THERAPY: CPT | Performed by: PHYSICAL MEDICINE & REHABILITATION

## 2019-08-22 PROCEDURE — 97110 THERAPEUTIC EXERCISES: CPT | Performed by: PHYSICAL MEDICINE & REHABILITATION

## 2019-08-22 NOTE — PROGRESS NOTES
PT DAILY TREATMENT NOTE - [facility] 2-15    Patient Name: Agnieszka Burns  Date:2019  : 1960  [x]  Patient  Verified  Payor: ADÁN / Plan: [facility] % / Product Type: [address] /    In time:315 pm  Out time:415 pm  Total Treatment Time (min):60  Total Timed Codes (min): 45  1:1 Treatment Time ([address] only): -   Visit #: 18      Treatment Area: Sacrococcygeal disorders, not elsewhere classified [M53.3]  Benign prostatic hyperplasia with lower urinary tract symptoms [N40.1]    SUBJECTIVE  Pain Level (0-10 scale): 7-8/10  Any medication changes, allergies to medications, adverse drug reactions, diagnosis change, or new procedure performed?: [x] No    [] Yes (see summary sheet for update)  Subjective functional status/changes:   [x] No changes reported    OBJECTIVE            Modality rationale: decrease inflammation, decrease pain and increase tissue extensibility to improve the patients ability to ADLs, sleeping and standing tolerance   Min Type Additional Details     [] Estim: []Att   []Unatt        []TENS instruct                  []IFC  []Premod   []NMES                     []Other:  []w/US   []w/ice   []w/heat  Position:   Location:      []  Traction: [] Cervical       []Lumbar                       [] Prone          []Supine                       []Intermittent   []Continuous Lbs:   [] before manual  [] after manual  []w/heat      []  Ultrasound: []Continuous   [] Pulsed at:                           []1MHz   []3MHz Location:  W/cm2:      [] Paraffin         Location:   []w/heat      []  Ice     [x]  Heat  []  Ice massage Position:seated  Location: back      []  Laser  []  Other: Position:  Location:        15 [x]  Vasopneumatic Device Pressure:       [] lo [x] med [] hi   Temperature: 34       [x] Skin assessment post-treatment:  [x]intact []redness- no adverse reaction    []redness  adverse reaction:     45 min Therapeutic Exercise:  [x] See flow sheet :   Rationale: increase ROM, increase strength and improve coordination to improve the patients ability to ADLs, standing and walking tolerance            With   [x] TE   [] TA   [] neuro   [] other: Patient Education: [x] Review HEP    [] Progressed/Changed HEP based on:   [] positioning   [] body mechanics   [] transfers   [] heat/ice application    [] other:      Other Objective/Functional Measures: Only pain with standing hip ABD but not done today. Pain Level (0-10 scale) post treatment: \"same\"    ASSESSMENT/Changes in Function:     Patient will continue to benefit from skilled PT services to modify and progress therapeutic interventions, address functional mobility deficits, address ROM deficits, address strength deficits, analyze and address soft tissue restrictions, analyze and cue movement patterns, analyze and modify body mechanics/ergonomics and assess and modify postural abnormalities to attain remaining goals.      []  See Plan of Care  []  See progress note/recertification  []  See Discharge Summary         Will continue 1 x week till he sees his Neurosurgeon    PLAN  [x]  Upgrade activities as tolerated     [x]  Continue plan of care  [x]  Update interventions per flow sheet       []  Discharge due to:_  []  Other:_      Connor Harris  , ZOFIA, CPT   8/22/2019

## 2019-08-26 ENCOUNTER — APPOINTMENT (OUTPATIENT)
Dept: PHYSICAL THERAPY | Age: 59
End: 2019-08-26
Payer: SUBSIDIZED

## 2019-08-29 ENCOUNTER — HOSPITAL ENCOUNTER (OUTPATIENT)
Dept: PHYSICAL THERAPY | Age: 59
Discharge: HOME | End: 2019-08-29
Payer: SUBSIDIZED

## 2019-08-29 PROCEDURE — 97110 THERAPEUTIC EXERCISES: CPT | Performed by: PHYSICAL THERAPIST

## 2019-08-29 PROCEDURE — 97016 VASOPNEUMATIC DEVICE THERAPY: CPT | Performed by: PHYSICAL THERAPIST

## 2019-08-29 NOTE — PROGRESS NOTES
PT DAILY TREATMENT NOTE - [facility] 2-15    Patient Name: Roland Palacios  Date:2019  : 1960  [x]  Patient  Verified  Payor: José Anguiano / [facility] / Product Type: [id number] /    In time:3:45 PM Out time:4:45 pm  Total Treatment Time (min):60  Total Timed Codes (min): 45  1:1 Treatment Time ( [address] only): -   Visit #: 19      Treatment Area: Sacrococcygeal disorders, not elsewhere classified [M53.3]  Benign prostatic hyperplasia with lower urinary tract symptoms [N40.1]    SUBJECTIVE  Pain Level (0-10 scale): 7-8/10  Any medication changes, allergies to medications, adverse drug reactions, diagnosis change, or new procedure performed?: [x] No    [] Yes (see summary sheet for update)  Subjective functional status/changes:   [] No changes reported  Continued no change since his last visit. The pain stays a constant 7-8/10 throughout the day.     OBJECTIVE            Modality rationale: decrease inflammation, decrease pain and increase tissue extensibility to improve the patients ability to ADLs, sleeping and standing tolerance   Min Type Additional Details     [] Estim: []Att   []Unatt        []TENS instruct                  []IFC  []Premod   []NMES                     []Other:  []w/US   []w/ice   []w/heat  Position:   Location:      []  Traction: [] Cervical       []Lumbar                       [] Prone          []Supine                       []Intermittent   []Continuous Lbs:   [] before manual  [] after manual  []w/heat      []  Ultrasound: []Continuous   [] Pulsed at:                           []1MHz   []3MHz Location:  W/cm2:      [] Paraffin         Location:   []w/heat      []  Ice     [x]  Heat  []  Ice massage Position:seated  Location: back      []  Laser  []  Other: Position:  Location:        15 [x]  Vasopneumatic Device Pressure:       [] lo [x] med [] hi   Temperature: 34       [x] Skin assessment post-treatment:  [x]intact []redness- no adverse reaction    []redness  adverse reaction:     45 min Therapeutic Exercise:  [x] See flow sheet :   Rationale: increase ROM, increase strength and improve coordination to improve the patients ability to ADLs, standing and walking tolerance            With   [x] TE   [] TA   [] neuro   [] other: Patient Education: [x] Review HEP    [] Progressed/Changed HEP based on:   [] positioning   [] body mechanics   [] transfers   [] heat/ice application    [] other:      Other Objective/Functional Measures:       Pain Level (0-10 scale) post treatment: \"same\"    ASSESSMENT/Changes in Function:     Patient will continue to benefit from skilled PT services to modify and progress therapeutic interventions, address functional mobility deficits, address ROM deficits, address strength deficits, analyze and address soft tissue restrictions, analyze and cue movement patterns, analyze and modify body mechanics/ergonomics and assess and modify postural abnormalities to attain remaining goals. []  See Plan of Care  []  See progress note/recertification  []  See Discharge Summary         Continued lack of progress towards long term goals. Will continue POC at 1 visit/week as patient prepares for [facility] consult. PLAN  [x]  Upgrade activities as tolerated     [x]  Continue plan of care  [x]  Update interventions per flow sheet       []  Discharge due to:_  []  Other:_      Sasha Soriano, PT  , DPT, OCS, Cert.  DN     8/29/2019

## 2019-09-09 ENCOUNTER — OFFICE VISIT (OUTPATIENT)
Dept: NEUROLOGY | Age: 59
End: 2019-09-09

## 2019-09-09 VITALS
DIASTOLIC BLOOD PRESSURE: 88 MMHG | HEIGHT: 64 IN | HEART RATE: 95 BPM | RESPIRATION RATE: 20 BRPM | BODY MASS INDEX: 36.02 KG/M2 | SYSTOLIC BLOOD PRESSURE: 136 MMHG | WEIGHT: 211 LBS | OXYGEN SATURATION: 99 %

## 2019-09-09 DIAGNOSIS — M48.07 LUMBOSACRAL STENOSIS WITH NEUROGENIC CLAUDICATION (HCC): Primary | ICD-10-CM

## 2019-09-09 DIAGNOSIS — R41.3 MEMORY CHANGES: ICD-10-CM

## 2019-09-09 DIAGNOSIS — G95.19 LUMBOSACRAL STENOSIS WITH NEUROGENIC CLAUDICATION (HCC): Primary | ICD-10-CM

## 2019-09-09 RX ORDER — BACLOFEN 10 MG/1
TABLET ORAL
Qty: 50 TAB | Refills: 1 | Status: SHIPPED | OUTPATIENT
Start: 2019-09-09

## 2019-09-09 RX ORDER — AMITRIPTYLINE HYDROCHLORIDE 10 MG/1
TABLET, FILM COATED ORAL
Qty: 30 TAB | Refills: 3 | Status: SHIPPED | OUTPATIENT
Start: 2019-09-09

## 2019-09-09 NOTE — PROGRESS NOTES
Lumbosacral neurogenic claudication. Neurology Consult      Subjective:      Ji Thomas is a 61 y.o. male who comes in today with the following history. He is 2 years of worsening low back pain. Has recently failed physical therapy and sacroiliac injections and lateral femoral cutaneous injections as well? He has had 3 falls within the last 30 days and experiences subjective electric shocks down both legs. Bowel and bladder function reveals bladder urinary retention at times and occasions of bowel movement release prematurely. Chronic low back pain and enhance by any type of sitting up or standing up although lying down is uncomfortable as well. Lying on his left side is least uncomfortable. Recent MRI of the lumbosacral spine show cord compression at T10-11 and moderate to severe central canal stenosis. At T2 L1 mild to moderate central stenosis. At L1 to moderate canal stenosis and moderate bilateral neuroforaminal encroachment. At L2-3 moderate canal stenosis and moderate bilateral neuroforaminal encroachment. At L3-4 moderate to severe canal stenosis and moderate bilateral neuroforaminal encroachment. At L4-5 moderate canal stenosis and moderate bilateral neuroforaminal encroachment. Moderate to severe facet hypertrophy. at L5-S1. Gabapentin for the low back discomfort has done nothing up to 600 mg 3 times a day. Also a quick reference at the end that his memory has been bad for months now. There is some distractibility with the back issue but this seems to be insufficient to explain his intellectual performance. Patient and significant other anxious to get on with viable remedies for his chronic low back pain and leg dysfunction.        Current Outpatient Medications   Medication Sig Dispense Refill    amitriptyline (ELAVIL) 10 mg tablet 1 po qhs  Indications: Neuropathic Pain 30 Tab 3    baclofen (LIORESAL) 10 mg tablet 1/2 po tid prn  Indications: Muscle Spasms caused by a Spinal Disease 50 Tab 1    lisinopril (PRINIVIL, ZESTRIL) 10 mg tablet TAKE ONE TABLET BY MOUTH ONE TIME DAILY 90 Tab 3    amLODIPine (NORVASC) 10 mg tablet Take 1 Tab by mouth daily. 90 Tab 3    tamsulosin (FLOMAX) 0.4 mg capsule TAKE ONE CAPSULE BY MOUTH ONE TIME DAILY 90 Cap 4    naproxen sodium (ALEVE) 220 mg cap Take  by mouth.  methylPREDNISolone (MEDROL DOSEPACK) 4 mg tablet Take as directed 1 Dose Pack 0    methylPREDNISolone (MEDROL DOSEPACK) 4 mg tablet Take as directed 1 Dose Pack 0    fluvastatin (LESCOL) 20 mg capsule Take 1 Cap by mouth nightly. 30 Cap 0      Allergies   Allergen Reactions    Azithromycin Hives and Itching    Erythromycin Hives and Itching     Past Medical History:   Diagnosis Date    Arthritis     BPH (benign prostatic hyperplasia)     CAD (coronary artery disease)     Fall     Hypertension     Incontinence     Snoring     Stroke [facility]) 2004    Tinnitus     Vagal nerve sensitivity       No past surgical history on file.    Social History     Socioeconomic History    Marital status:      Spouse name: Not on file    Number of children: Not on file    Years of education: Not on file    Highest education level: Not on file   Occupational History    Not on file   Social Needs    Financial resource strain: Not on file    Food insecurity:     Worry: Not on file     Inability: Not on file    Transportation needs:     Medical: Not on file     Non-medical: Not on file   Tobacco Use    Smoking status: Current Every Day Smoker     Packs/day: 1.00     Years: 49.00     Pack years: 49.00     Types: Cigarettes    Smokeless tobacco: Never Used   Substance and Sexual Activity    Alcohol use: Not Currently     Frequency: Never    Drug use: Not Currently    Sexual activity: Yes     Partners: Female     Birth control/protection: None   Lifestyle    Physical activity:     Days per week: Not on file     Minutes per session: Not on file    Stress: Not on file Relationships    Social connections:     Talks on phone: Not on file     Gets together: Not on file     Attends Taoist service: Not on file     Active member of club or organization: Not on file     Attends meetings of clubs or organizations: Not on file     Relationship status: Not on file    Intimate partner violence:     Fear of current or ex partner: Not on file     Emotionally abused: Not on file     Physically abused: Not on file     Forced sexual activity: Not on file   Other Topics Concern    Not on file   Social History Narrative    Not on file      Family History   Problem Relation Age of Onset    Dementia Mother     Heart Disease Father     Cancer Sister       Visit Vitals  /88   Pulse 95   Resp 20   Ht 5' 4\" (1.626 m)   Wt 95.7 kg (211 lb)   SpO2 99%   BMI 36.22 kg/m²        Review of Systems:   A comprehensive review of systems was negative except for that written in the HPI. Neuro Exam:     Appearance: The patient is well developed, well nourished, provides a coherent history and is in no acute distress. Mental Status: Oriented to time, place and person. Mood and affect appropriate. Cranial Nerves:   Intact visual fields. Fundi are benign. CARITO, EOM's full, no nystagmus, no ptosis. Facial sensation is normal. Corneal reflexes are intact. Facial movement is symmetric. Hearing is normal bilaterally. Palate is midline with normal sternocleidomastoid and trapezius muscles are normal. Tongue is midline. Motor:  5/5 strength in upper and lower proximal and distal muscles. Normal bulk and tone. No fasciculations. Reflexes:   Deep tendon reflexes 0-1+/4 and symmetrical.   Sensory:    Diminished distally to touch, pinprick and vibration. No position sense in the feet this a.m. Gait:   Patient's transfers and gait are taken slow and cautious and very mechanical and stiff and short spaced. Exaggerates his spinal straightening step to step and has antalgic posturing. Tremor:   No tremor noted. Cerebellar:  No cerebellar signs present. Neurovascular:  Normal heart sounds and regular rhythm, peripheral pulses intact, and no carotid bruits. Toes strong withdrawal response no clonus positive spasticity both legs. Straight leg raising +90 degrees for low back discomfort. Assessment:   Lumbosacral neurogenic claudication. Will refer to neurosurgery and placed on amitriptyline 10 mg nightly for neuropathic pain and warned of sedation. During the day can try baclofen 10 mg half a tablet 3 times daily as needed. Again warned of sedation. Please get CD of MRI of the low back and take it with him to the neurosurgery appointment. Memory concerns. We can certainly check this out, but in terms of priorities need to get the spine evaluated/managed- sooner rather than later. Plan:   revisit pended.   Signed by :  Indigo Brock MD

## 2019-09-09 NOTE — Clinical Note
9/9/19 Patient: Nile Espinoza YOB: 1960 Date of Visit: 9/9/2019 None None ([phone]) Patient Stated That They Have No Pcp 
VIA Irais Arias MD 
[address] [facility] [address] [facility] [id number] VIA In Basket Dear None Irais Arias MD, Thank you for referring Mr. Mirlande Gaitan to [facility] for evaluation. My notes for this consultation are attached. If you have questions, please do not hesitate to call me. I look forward to following your patient along with you.  
 
 
Sincerely, 
 
Morris Soto MD

## 2019-09-09 NOTE — PATIENT INSTRUCTIONS
Information Regarding Testing     If you have physican order for a test or a medication denied by your insurance company, this does not mean the test or medication is not appropriate for you as that is a medical decision, not a decision to be made by an insurance company representative or by an [facility] physician who has not interviewed and examined you. This is a decision to be made between you and your physician. The denial of services is a contractual matter between you and your insurance company, not an issue between your physician and the insurance company. If your test or medication is denied, you can take the following steps to help resolve the issue:    1. File a complaint with the [facility] regarding your insurance company's denial of services ordered for you. You can do this either by calling them directly or by completing an on-line complaint form on the [employer]. This can be found at [email or web address]    2. Also file a formal complaint with your insurance company and ask to have the name of the person denying the service so that you may explore a legal option should you be harmed by this denial of service. Again, the fact the insurance company will not pay for the service does not mean it is not medically necessary and I would encourage you to follow through with the plan that was made with your physician    3. File a written complaint with your employer so your employer and benefit manager is aware of the poor coverage they are providing their employees. If you have medicare/medicaid, complain to your representative in the House and to your Dali Colby. RESULT POLICY      If we have ordered testing for you, know that; \"NO NEWS IS GOOD NEWS! \"     It is our policy that we no longer call patients with results, nor do we  give test results over the phone.       We schedule follow up appointments when ALL testing is completed so that your results can be discussed in person. This allows you to address any questions you have regarding the results. If you choose to go to an imaging center outside of [facility] it is your responsibility to bring imaging report and disc to follow up appointment. If something of concern is revealed on your test, we will contact you to discuss the matter and if needed schedule a sooner follow up appointment. Additionally, results may be found by using the My Chart feature and one of our patient service representatives at the  can give you instructions on how to access this feature to utilize our electronic medical record system. Thank you for your understanding. Preventing Falls: Care Instructions  Your Care Instructions    Getting around your home safely can be a challenge if you have injuries or health problems that make it easy for you to fall. Loose rugs and furniture in walkways are among the dangers for many older people who have problems walking or who have poor eyesight. People who have conditions such as arthritis, osteoporosis, or dementia also have to be careful not to fall. You can make your home safer with a few simple measures. Follow-up care is a key part of your treatment and safety. Be sure to make and go to all appointments, and call your doctor if you are having problems. It's also a good idea to know your test results and keep a list of the medicines you take. How can you care for yourself at home? Taking care of yourself  · You may get dizzy if you do not drink enough water. To prevent dehydration, drink plenty of fluids, enough so that your urine is light yellow or clear like water. Choose water and other caffeine-free clear liquids. If you have kidney, heart, or liver disease and have to limit fluids, talk with your doctor before you increase the amount of fluids you drink.   · Exercise regularly to improve your strength, muscle tone, and balance. Walk if you can. Swimming may be a good choice if you cannot walk easily. · Have your vision and hearing checked each year or any time you notice a change. If you have trouble seeing and hearing, you might not be able to avoid objects and could lose your balance. · Know the side effects of the medicines you take. Ask your doctor or pharmacist whether the medicines you take can affect your balance. Sleeping pills or sedatives can affect your balance. · Limit the amount of alcohol you drink. Alcohol can impair your balance and other senses. · Ask your doctor whether calluses or corns on your feet need to be removed. If you wear loose-fitting shoes because of calluses or corns, you can lose your balance and fall. · Talk to your doctor if you have numbness in your feet. Preventing falls at home  · Remove raised doorway thresholds, throw rugs, and clutter. Repair loose carpet or raised areas in the floor. · Move furniture and electrical cords to keep them out of walking paths. · Use nonskid floor wax, and wipe up spills right away, especially on ceramic tile floors. · If you use a walker or cane, put rubber tips on it. If you use crutches, clean the bottoms of them regularly with an abrasive pad, such as steel wool. · Keep your house well lit, especially Marandrew Adam, and outside walkways. Use night-lights in areas such as hallways and bathrooms. Add extra light switches or use remote switches (such as switches that go on or off when you clap your hands) to make it easier to turn lights on if you have to get up during the night. · Install sturdy handrails on stairways. · Move items in your cabinets so that the things you use a lot are on the lower shelves (about waist level). · Keep a cordless phone and a flashlight with new batteries by your bed. If possible, put a phone in each of the main rooms of your house, or carry a cell phone in case you fall and cannot reach a phone.  Or, you can wear a device around your neck or wrist. You push a button that sends a signal for help. · Wear low-heeled shoes that fit well and give your feet good support. Use footwear with nonskid soles. Check the heels and soles of your shoes for wear. Repair or replace worn heels or soles. · Do not wear socks without shoes on wood floors. · Walk on the grass when the sidewalks are slippery. If you live in an area that gets snow and ice in the winter, sprinkle salt on slippery steps and sidewalks. Preventing falls in the bath  · Install grab bars and nonskid mats inside and outside your shower or tub and near the toilet and sinks. · Use shower chairs and bath benches. · Use a hand-held shower head that will allow you to sit while showering. · Get into a tub or shower by putting the weaker leg in first. Get out of a tub or shower with your strong side first.  · Repair loose toilet seats and consider installing a raised toilet seat to make getting on and off the toilet easier. · Keep your bathroom door unlocked while you are in the shower. Where can you learn more? Go to http://tracy-tana.info/. Enter 0476 [id number] 69 [phone] in the search box to learn more about \"Preventing Falls: Care Instructions. \"  Current as of: November 7, 2018  Content Version: 12.1  © 1645-7769 [employer]. Care instructions adapted under license by [employer] (which disclaims liability or warranty for this information). If you have questions about a medical condition or this instruction, always ask your healthcare professional. [place] [address] any warranty or liability for your use of this information. Learning About Living [place] Free[place]  What is a living will? A living will is a legal form you use to write down the kind of care you want at the end of your life. It is used by the health professionals who will treat you if you aren't able to decide for yourself.   If you put your wishes in writing, your loved ones and others will know what kind of care you want. They won't need to guess. This can ease your mind and be helpful to others. A living will is not the same as an estate or property will. An estate will explains what you want to happen with your money and property after you die. Is a living will a legal document? A living will is a legal document. Each state has its own laws about living [place]. If you move to another state, make sure that your living will is legal in the state where you now live. Or you might use a universal form that has been approved by many states. This kind of form can sometimes be completed and stored online. Your electronic copy will then be available wherever you have a connection to the Internet. In most cases, doctors will respect your wishes even if you have a form from a different state. · You don't need an  to complete a living will. But legal advice can be helpful if your state's laws are unclear, your health history is complicated, or your family can't agree on what should be in your living will. · You can change your living will at any time. Some people find that their wishes about end-of-life care change as their health changes. · In addition to making a living will, think about completing a medical power of  form. This form lets you name the person you want to make end-of-life treatment decisions for you (your \"health care agent\") if you're not able to. Many hospitals and nursing homes will give you the forms you need to complete a living will and a medical power of . · Your living will is used only if you can't make or communicate decisions for yourself anymore. If you become able to make decisions again, you can accept or refuse any treatment, no matter what you wrote in your living will. · Your state may offer an online registry.  This is a place where you can store your living will online so the doctors and nurses who need to treat you can find it right away. What should you think about when creating a living will? Talk about your end-of-life wishes with your family members and your doctor. Let them know what you want. That way the people making decisions for you won't be surprised by your choices. Think about these questions as you make your living will:  · Do you know enough about life support methods that might be used? If not, talk to your doctor so you know what might be done if you can't breathe on your own, your heart stops, or you're unable to swallow. · What things would you still want to be able to do after you receive life-support methods? Would you want to be able to walk? To speak? To eat on your own? To live without the help of machines? · If you have a choice, where do you want to be cared for? In your home? At a hospital or nursing home? · Do you want certain Sikh practices performed if you become very ill? · If you have a choice at the end of your life, where would you prefer to die? At home? In a hospital or nursing home? Somewhere else? · Would you prefer to be buried or cremated? · Do you want your organs to be donated after you die? What should you do with your living will? · Make sure that your family members and your health care agent have copies of your living will. · Give your doctor a copy of your living will to keep in your medical record. If you have more than one doctor, make sure that each one has a copy. · You may want to put a copy of your living will where it can be easily found. Where can you learn more? Go to http://tracy-tana.info/. Enter L650 in the search box to learn more about \"Learning About Living Perrokirsty. \"  Current as of: April 1, 2019  Content Version: 12.1  © 8408-6845 [employer]. Care instructions adapted under license by [employer] (which disclaims liability or warranty for this information).  If you have questions about a medical condition or this instruction, always ask your healthcare professional. [address] any warranty or liability for your use of this information. Patient with significant lumbosacral stenosis including cord compression at the thoracic lumbar transition. Will refer to neurosurgery. We will put amitriptyline at night which may help sleep and promote nerve pain relief as well as baclofen in the day which may help decrease the spasticity. Both of these agents can cause sedation so need to be careful. A CD copy of the MRI spinal imaging and take it with you to the neurosurgery appointment.

## 2020-01-14 NOTE — PROGRESS NOTES
[address] [place] [address]  Phone: [phone]  Fax: [phone]    Discharge Summary  2-15    Patient name: Shwetha Boyle  : 1960  Provider#: [id number]  Referral source: Referred, Self, MD      Medical/Treatment Diagnosis: Sacrococcygeal disorders, not elsewhere classified [M53.3]  Benign prostatic hyperplasia with lower urinary tract symptoms [N40.1]     Prior Hospitalization: see medical history     Comorbidities: See Plan of Care  Prior Level of Function:See Plan of Care  Medications: Verified on Patient Summary List    Start of Care: 19       Onset Date: Chronic  Visits from Start of Care: 12     Missed Visits: 0  Reporting Period : 19 - 19      ASSESSMENT/SUMMARY OF CARE:  Pt referred to PT for evaluation and treatment of LBP, B LE pain. Treatment consisted of manual therapy, therapeutic exercise, therapeutic activity, patient education, posture and body mechanics training, modalities, home exercise program development and progression. Pt demonstrated poor carry over of instruction visit to visit, demonstrated non compliance with recommended home exercise program.  Pt made no appreciable progress toward goals,he was discharged due to lack of progress. No goals met in 12 visits, referred back to MD for further assessment.          RECOMMENDATIONS:  [x]Discontinue therapy: []Patient has reached or is progressing toward set goals      []Patient is non-compliant or has abdicated      [x]Due to lack of appreciable progress towards set goals      []Other      Alban Nassar, PT, MSPT   2020

## 2020-11-07 NOTE — TELEPHONE ENCOUNTER
Returned call to patients wife, she stated he went to the bathroom and had a bowl movement and when he came out he stated he felt like he was going to pass out and his BP was 151/103 and she gave him his medication. He is now in the bathroom again. She would like to speak with a nurse to see if she should take him to the ED instead of waiting until 6:15pm to come for an appointment. Call taken by nurse Tamra Parker. stretcher
